# Patient Record
Sex: FEMALE | Race: WHITE | NOT HISPANIC OR LATINO | Employment: OTHER | URBAN - METROPOLITAN AREA
[De-identification: names, ages, dates, MRNs, and addresses within clinical notes are randomized per-mention and may not be internally consistent; named-entity substitution may affect disease eponyms.]

---

## 2017-08-09 ENCOUNTER — OFFICE VISIT (OUTPATIENT)
Dept: ENDOCRINOLOGY | Age: 52
End: 2017-08-09

## 2017-08-09 VITALS
BODY MASS INDEX: 19.83 KG/M2 | DIASTOLIC BLOOD PRESSURE: 62 MMHG | RESPIRATION RATE: 16 BRPM | SYSTOLIC BLOOD PRESSURE: 114 MMHG | HEIGHT: 61 IN | WEIGHT: 105 LBS

## 2017-08-09 DIAGNOSIS — E03.9 PRIMARY HYPOTHYROIDISM: ICD-10-CM

## 2017-08-09 DIAGNOSIS — E10.9 TYPE 1 DIABETES MELLITUS WITHOUT COMPLICATION (HCC): Primary | ICD-10-CM

## 2017-08-09 DIAGNOSIS — N95.1 MENOPAUSAL SYMPTOMS: ICD-10-CM

## 2017-08-09 DIAGNOSIS — E11.649 DIABETIC HYPOGLYCEMIA (HCC): ICD-10-CM

## 2017-08-09 DIAGNOSIS — D64.9 ANEMIA, UNSPECIFIED TYPE: ICD-10-CM

## 2017-08-09 PROCEDURE — 99205 OFFICE O/P NEW HI 60 MIN: CPT | Performed by: INTERNAL MEDICINE

## 2017-08-09 RX ORDER — LANCETS 28 GAUGE
EACH MISCELLANEOUS
Qty: 300 EACH | Refills: 5 | Status: SHIPPED | OUTPATIENT
Start: 2017-08-09 | End: 2017-08-09 | Stop reason: SDUPTHER

## 2017-08-09 RX ORDER — LEVOTHYROXINE SODIUM 100 UG/1
100 TABLET ORAL DAILY
Qty: 30 TABLET | Refills: 5 | Status: SHIPPED | OUTPATIENT
Start: 2017-08-09 | End: 2018-05-15 | Stop reason: SDUPTHER

## 2017-08-09 RX ORDER — SYRINGE-NEEDLE,INSULIN,0.5 ML 28GX1/2"
SYRINGE, EMPTY DISPOSABLE MISCELLANEOUS
Qty: 90 EACH | Refills: 1 | Status: SHIPPED | OUTPATIENT
Start: 2017-08-09 | End: 2017-08-09 | Stop reason: SDUPTHER

## 2017-08-09 RX ORDER — LEVOTHYROXINE SODIUM 0.1 MG/1
100 TABLET ORAL DAILY
COMMUNITY
End: 2017-08-09

## 2017-08-09 RX ORDER — LANCETS 28 GAUGE
EACH MISCELLANEOUS
Qty: 300 EACH | Refills: 5 | Status: SHIPPED | OUTPATIENT
Start: 2017-08-09

## 2017-08-09 RX ORDER — SYRINGE-NEEDLE,INSULIN,0.5 ML 28GX1/2"
SYRINGE, EMPTY DISPOSABLE MISCELLANEOUS
Qty: 90 EACH | Refills: 1 | Status: SHIPPED | OUTPATIENT
Start: 2017-08-09
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
## 2017-08-15 LAB
25(OH)D3+25(OH)D2 SERPL-MCNC: 34.4 NG/ML (ref 30–100)
ACTH PLAS-MCNC: 6.8 PG/ML (ref 7.2–63.3)
ALBUMIN SERPL-MCNC: 4.2 G/DL (ref 3.5–5.2)
ALBUMIN/GLOB SERPL: 1.3 G/DL
ALP SERPL-CCNC: 96 U/L (ref 39–117)
ALT SERPL-CCNC: 12 U/L (ref 1–33)
AST SERPL-CCNC: 21 U/L (ref 1–32)
BASOPHILS # BLD AUTO: 0.08 10*3/MM3 (ref 0–0.2)
BASOPHILS NFR BLD AUTO: 1.5 % (ref 0–1.5)
BILIRUB SERPL-MCNC: 0.7 MG/DL (ref 0.1–1.2)
BUN SERPL-MCNC: 11 MG/DL (ref 6–20)
BUN/CREAT SERPL: 15.3 (ref 7–25)
C PEPTIDE SERPL-MCNC: <0.1 NG/ML (ref 1.1–4.4)
CALCIUM SERPL-MCNC: 10.8 MG/DL (ref 8.6–10.5)
CHLORIDE SERPL-SCNC: 104 MMOL/L (ref 98–107)
CHOLEST SERPL-MCNC: 182 MG/DL (ref 0–200)
CO2 SERPL-SCNC: 23.9 MMOL/L (ref 22–29)
CORTIS SERPL-MCNC: 5 UG/DL
CREAT SERPL-MCNC: 0.72 MG/DL (ref 0.57–1)
EOSINOPHIL # BLD AUTO: 0.3 10*3/MM3 (ref 0–0.7)
EOSINOPHIL NFR BLD AUTO: 5.8 % (ref 0.3–6.2)
ERYTHROCYTE [DISTWIDTH] IN BLOOD BY AUTOMATED COUNT: 19.8 % (ref 11.7–13)
FERRITIN SERPL-MCNC: 7.22 NG/ML (ref 13–150)
FOLATE SERPL-MCNC: 11.94 NG/ML (ref 4.78–24.2)
FSH SERPL-ACNC: 90.4 MIU/ML
GAD65 AB SER IA-ACNC: 13.2 U/ML (ref 0–5)
GLOBULIN SER CALC-MCNC: 3.2 GM/DL
GLUCOSE SERPL-MCNC: 177 MG/DL (ref 65–99)
HBA1C MFR BLD: 6.4 % (ref 4.8–5.6)
HCT VFR BLD AUTO: 37.8 % (ref 35.6–45.5)
HDLC SERPL-MCNC: 73 MG/DL (ref 40–60)
HGB BLD-MCNC: 11.2 G/DL (ref 11.9–15.5)
IMM GRANULOCYTES # BLD: 0 10*3/MM3 (ref 0–0.03)
IMM GRANULOCYTES NFR BLD: 0 % (ref 0–0.5)
IRON SATN MFR SERPL: 5 % (ref 20–50)
IRON SERPL-MCNC: 26 MCG/DL (ref 37–145)
LDLC SERPL CALC-MCNC: 98 MG/DL (ref 0–100)
LH SERPL-ACNC: 49.2 MIU/ML
LYMPHOCYTES # BLD AUTO: 1.67 10*3/MM3 (ref 0.9–4.8)
LYMPHOCYTES NFR BLD AUTO: 32.1 % (ref 19.6–45.3)
MCH RBC QN AUTO: 24.8 PG (ref 26.9–32)
MCHC RBC AUTO-ENTMCNC: 29.6 G/DL (ref 32.4–36.3)
MCV RBC AUTO: 83.6 FL (ref 80.5–98.2)
MONOCYTES # BLD AUTO: 0.35 10*3/MM3 (ref 0.2–1.2)
MONOCYTES NFR BLD AUTO: 6.7 % (ref 5–12)
NEUTROPHILS # BLD AUTO: 2.8 10*3/MM3 (ref 1.9–8.1)
NEUTROPHILS NFR BLD AUTO: 53.9 % (ref 42.7–76)
NRBC BLD AUTO-RTO: 0 /100 WBC (ref 0–0)
PLATELET # BLD AUTO: 254 10*3/MM3 (ref 140–500)
POTASSIUM SERPL-SCNC: 4.6 MMOL/L (ref 3.5–5.2)
PROLACTIN SERPL-MCNC: 10.7 NG/ML (ref 4.8–23.3)
PROT SERPL-MCNC: 7.4 G/DL (ref 6–8.5)
RBC # BLD AUTO: 4.52 10*6/MM3 (ref 3.9–5.2)
RETICS/RBC NFR AUTO: 0.94 % (ref 0.5–1.5)
SODIUM SERPL-SCNC: 141 MMOL/L (ref 136–145)
T3FREE SERPL-MCNC: 2.8 PG/ML (ref 2–4.4)
T4 FREE SERPL-MCNC: 1.86 NG/DL (ref 0.93–1.7)
T4 SERPL-MCNC: 9.18 MCG/DL (ref 4.5–11.7)
THYROGLOB AB SERPL-ACNC: 503.7 IU/ML (ref 0–0.9)
THYROGLOB SERPL-MCNC: 13 NG/ML
TIBC SERPL-MCNC: 477 MCG/DL
TRIGL SERPL-MCNC: 53 MG/DL (ref 0–150)
TSH SERPL DL<=0.005 MIU/L-ACNC: 0.58 MIU/ML (ref 0.27–4.2)
UIBC SERPL-MCNC: 451 MCG/DL
URATE SERPL-MCNC: 3.5 MG/DL (ref 2.4–5.7)
VIT B12 SERPL-MCNC: 120 PG/ML (ref 211–946)
VLDLC SERPL CALC-MCNC: 10.6 MG/DL (ref 5–40)
WBC # BLD AUTO: 5.2 10*3/MM3 (ref 4.5–10.7)

## 2017-08-17 RX ORDER — METHYLDOPA 500 MG
TABLET ORAL
Qty: 30 EACH | Refills: 11 | Status: SHIPPED | OUTPATIENT
Start: 2017-08-17

## 2017-11-09 DIAGNOSIS — E03.9 PRIMARY HYPOTHYROIDISM: ICD-10-CM

## 2017-11-09 DIAGNOSIS — E78.5 HYPERLIPIDEMIA, UNSPECIFIED HYPERLIPIDEMIA TYPE: ICD-10-CM

## 2017-11-09 DIAGNOSIS — E55.9 AVITAMINOSIS D: ICD-10-CM

## 2017-11-09 DIAGNOSIS — E10.9 TYPE 1 DIABETES MELLITUS WITHOUT COMPLICATION (HCC): Primary | ICD-10-CM

## 2017-11-16 ENCOUNTER — OFFICE VISIT (OUTPATIENT)
Dept: ENDOCRINOLOGY | Age: 52
End: 2017-11-16

## 2017-11-16 VITALS
WEIGHT: 108 LBS | BODY MASS INDEX: 20.39 KG/M2 | HEIGHT: 61 IN | DIASTOLIC BLOOD PRESSURE: 68 MMHG | SYSTOLIC BLOOD PRESSURE: 120 MMHG

## 2017-11-16 DIAGNOSIS — E67.3 HYPERVITAMINOSIS D: ICD-10-CM

## 2017-11-16 DIAGNOSIS — E11.649 DIABETIC HYPOGLYCEMIA (HCC): ICD-10-CM

## 2017-11-16 DIAGNOSIS — R73.9 HYPERGLYCEMIA: ICD-10-CM

## 2017-11-16 DIAGNOSIS — E10.9 TYPE 1 DIABETES MELLITUS WITHOUT COMPLICATION (HCC): Primary | ICD-10-CM

## 2017-11-16 DIAGNOSIS — E03.9 PRIMARY HYPOTHYROIDISM: ICD-10-CM

## 2017-11-16 DIAGNOSIS — D50.8 IRON DEFICIENCY ANEMIA SECONDARY TO INADEQUATE DIETARY IRON INTAKE: ICD-10-CM

## 2017-11-16 PROCEDURE — 99214 OFFICE O/P EST MOD 30 MIN: CPT | Performed by: NURSE PRACTITIONER

## 2017-11-16 NOTE — PROGRESS NOTES
Skye Cabrera  presents to the office  for the follow-up appointment for Type 1 diabetes mellitus.     The diabete's condition location is throughout the system with the  clinical course has fluctuated, the severity is Mild, and the modifying/allievating factors are insulin pump.  Medications and  Dosages were  reviewed with Skye Cabrera and suggested that compliance most of the time.    Associated symptoms of hyperglycemia have been irritabilty.  Associated symptoms of hypoglycemia have been confusion, dizziness and sweating. Patient reports  hypoglycemia threshold for symptoms is 40 mg/dl .     The patient is currently on insulin.    Compliance with blood glucose monitoring: good.     Meal panning: The patient is using carbohydrate counting, but is not on a specified limit, being a pump user.    The patient is currently taking home blood tests - Blood glucose testin-10 times daily, that are:  fasting- 1st thing in morning before eating or drinking  before each meal  before each meal and 1 or 2 hours after meal  fasting and bedtime  bedtime  anytime you feel symptoms of hyperglycemia or hypoglycemia (high or low blood sugars)  Humalog U100  per insulin pump.    Last instructions given were:   In summary I saw and examined this 52-year-old female for above-mentioned problems.  I reviewed her office notes as well as laboratory evaluation from her former endocrinologist as well as family physician.  At this time I am going to go ahead and order an extensive laboratory evaluation and once the results come back we will go ahead and call for any possible modifications.  In the meantime I asked her to continue all her current prescriptions.  Because of multiple episodes of hypoglycemia I am relieved varying her to Ms. Monserrat Song for starting her on Medtronic 6:30 G and regulate her insulin injection and to minimize or prevent hypoglycemic episodes.    Home blood glucose testing daily: 8-10  insulin pump upload  -Yes -  "    Last reported blood glucose readings are:   She is using a Medtronic insulin pump. She is checking her BG 9 times a day. She states that she is having hypoglycemic episodes everyday.     Patterns reported per patient are none.         The following portions of the patient's history were reviewed and updated as appropriate: current medications, past family history, past medical history, past social history, past surgical history and problem list.      Current Outpatient Prescriptions:   •  amoxicillin (AMOXIL) 875 MG tablet, 1 pill q12, Disp: 20 tablet, Rfl: 0  •  JIMENEZ CONTOUR NEXT TEST test strip, Check blood glucose up to 9 times daily.  Use as instructed, Disp: 300 each, Rfl: 11  •  cyanocobalamin (VITAMIN B-12) 500 MCG tablet, Take 1 tablet by mouth Daily., Disp: 100 tablet, Rfl: 2  •  glucagon (GLUCAGEN) 1 MG injection, Inject 1 mg under the skin See Admin Instructions. Follow package directions for low blood sugar., Disp: 2 kit, Rfl: 11  •  guaiFENesin-codeine (ROMILAR-AC) 100-10 MG/5ML syrup, 5-10 cc's by mouth QID as needed for cough, Disp: 240 mL, Rfl: 0  •  insulin aspart (novoLOG) 100 UNIT/ML injection, Use of insulin pump up to 100 units daily., Disp: 30 mL, Rfl: 5  •  insulin lispro (humaLOG) 100 UNIT/ML injection, Use in insulin pump up to 100 units a day, Disp: 30 mL, Rfl: 5  •  Insulin Syringe 30G X 1/2\" 0.5 ML misc, Use for insulin injection as needed., Disp: 90 each, Rfl: 1  •  Lancets (FREESTYLE) lancets, Check blood glucose up to 9 times daily., Disp: 300 each, Rfl: 5  •  SLOW RELEASE IRON 160 (50 Fe) MG tablet controlled-release, 1 daily, Disp: 30 each, Rfl: 11  •  UNITHROID 100 MCG tablet, Take 1 tablet by mouth Daily., Disp: 30 tablet, Rfl: 5    Patient Active Problem List    Diagnosis   • Hyperglycemia [R73.9]   • Menopausal symptoms [N95.1]   • Diabetic hypoglycemia [E11.649]   • Type 1 diabetes mellitus without complication [E10.9]   • Primary hypothyroidism [E03.9]   • Anemia [D64.9] " "      Review of Systems   A comprehensive review of the 14 systems was negative except of listed below:  Endocrine: hyperglycemia     Objective:     Wt Readings from Last 3 Encounters:   11/16/17 108 lb (49 kg)   08/09/17 105 lb (47.6 kg)     Temp Readings from Last 3 Encounters:   09/19/17 99.5 °F (37.5 °C)     BP Readings from Last 3 Encounters:   11/16/17 120/68   09/19/17 126/71   08/09/17 114/62     Pulse Readings from Last 3 Encounters:   09/19/17 91        /68  Ht 61\" (154.9 cm)  Wt 108 lb (49 kg)  BMI 20.41 kg/m2    General appearance:  alert, cooperative, delirious, fatigued, nourished\" \"appears stated age and cooperative\" \"NAD   Neck: no carotid bruit, supple, symmetrical, trachea midline and thyroid not enlarged, symmetric, no tenderness/mass/nodules   Thyroid:  no palpable nodule, no enlargement, no tenderness   Lung:  \"clear to auscultation bilaterally\" \"no abnormal breath sounds  \" effort was normal, no labored breath, no use of accessory muscles.   Heart: regular rate and rhythm, S1, S2 normal, no murmur, click, rub or gallop      Abdomen:  normal bowel sounds- 4 quads, soft non-tender and contour - flat      Extremities: extremities normal, atraumatic, no cyanosis or edema extremities normal, atraumatic, no cyanosis or edema\" WNL - gait and station, strength and stability\"       Skin:   Pulses:  warm and dry, no hyperpigmentation, normal skin coloring, or suspicious lesions   2+ and symmetric   Neuro: Alert and oriented x3. Gait normal. Reflexes and motor strength normal and symmetric. Cranial nerves 2-12 and sensation grossly intact.      Psych: behavior - normal, judgement - normal, mood - normal, affect - normal                 Lab Review  Results for orders placed or performed in visit on 08/09/17   T3, Free   Result Value Ref Range    T3, Free 2.8 2.0 - 4.4 pg/mL   T4 & TSH ([facility])   Result Value Ref Range    TSH 0.583 0.270 - 4.200 mIU/mL    T4, Total 9.18 4.50 - 11.70 mcg/dL   T4, " Free   Result Value Ref Range    Free T4 1.86 (H) 0.93 - 1.70 ng/dL   Thyroglobulin With Anti-TG   Result Value Ref Range    Thyroglobulin Ab 503.7 (H) 0.0 - 0.9 IU/mL   Uric Acid   Result Value Ref Range    Uric Acid 3.5 2.4 - 5.7 mg/dL   Vitamin D 25 Hydroxy   Result Value Ref Range    25 Hydroxy, Vitamin D 34.4 30.0 - 100.0 ng/mL   Comprehensive Metabolic Panel   Result Value Ref Range    Glucose 177 (H) 65 - 99 mg/dL    BUN 11 6 - 20 mg/dL    Creatinine 0.72 0.57 - 1.00 mg/dL    eGFR Non African Am 84 >60 mL/min/1.73    eGFR African Am 102 >60 mL/min/1.73    BUN/Creatinine Ratio 15.3 7.0 - 25.0    Sodium 141 136 - 145 mmol/L    Potassium 4.6 3.5 - 5.2 mmol/L    Chloride 104 98 - 107 mmol/L    Total CO2 23.9 22.0 - 29.0 mmol/L    Calcium 10.8 (H) 8.6 - 10.5 mg/dL    Total Protein 7.4 6.0 - 8.5 g/dL    Albumin 4.20 3.50 - 5.20 g/dL    Globulin 3.2 gm/dL    A/G Ratio 1.3 g/dL    Total Bilirubin 0.7 0.1 - 1.2 mg/dL    Alkaline Phosphatase 96 39 - 117 U/L    AST (SGOT) 21 1 - 32 U/L    ALT (SGPT) 12 1 - 33 U/L   C-Peptide   Result Value Ref Range    C-Peptide <0.1 (L) 1.1 - 4.4 ng/mL   Follicle Stimulating Hormone   Result Value Ref Range    FSH 90.4 mIU/mL   Hemoglobin A1c   Result Value Ref Range    Hemoglobin A1C 6.40 (H) 4.80 - 5.60 %   Lipid Panel   Result Value Ref Range    Total Cholesterol 182 0 - 200 mg/dL    Triglycerides 53 0 - 150 mg/dL    HDL Cholesterol 73 (H) 40 - 60 mg/dL    VLDL Cholesterol 10.6 5 - 40 mg/dL    LDL Cholesterol  98 0 - 100 mg/dL   Luteinizing Hormone   Result Value Ref Range    LH 49.2 mIU/mL   Prolactin   Result Value Ref Range    Prolactin 10.7 4.8 - 23.3 ng/mL   ACTH   Result Value Ref Range    ACTH 6.8 (L) 7.2 - 63.3 pg/mL   Cortisol   Result Value Ref Range    Cortisol 5.0 ug/dL   Glutamic Acid Decarboxylase   Result Value Ref Range    MAXIMUS-65 13.2 (H) 0.0 - 5.0 U/mL   Iron Profile   Result Value Ref Range    TIBC 477 mcg/dL    UIBC 451 mcg/dL    Iron 26 (L) 37 - 145 mcg/dL     Iron Saturation 5 (L) 20 - 50 %   Vitamin B12 & Folate   Result Value Ref Range    Vitamin B-12 120 (L) 211 - 946 pg/mL    Folate 11.94 4.78 - 24.20 ng/mL   Thyroglobulin By IVY   Result Value Ref Range    Thyroglobulin (TG-IVY) 13 ng/mL   Ferritin   Result Value Ref Range    Ferritin 7.22 (L) 13.00 - 150.00 ng/mL   Reticulocytes   Result Value Ref Range    Reticulocyte Absolute 0.94 0.50 - 1.50 %   CBC & Differential   Result Value Ref Range    WBC 5.20 4.50 - 10.70 10*3/mm3    RBC 4.52 3.90 - 5.20 10*6/mm3    Hemoglobin 11.2 (L) 11.9 - 15.5 g/dL    Hematocrit 37.8 35.6 - 45.5 %    MCV 83.6 80.5 - 98.2 fL    MCH 24.8 (L) 26.9 - 32.0 pg    MCHC 29.6 (L) 32.4 - 36.3 g/dL    RDW 19.8 (H) 11.7 - 13.0 %    Platelets 254 140 - 500 10*3/mm3    Neutrophil Rel % 53.9 42.7 - 76.0 %    Lymphocyte Rel % 32.1 19.6 - 45.3 %    Monocyte Rel % 6.7 5.0 - 12.0 %    Eosinophil Rel % 5.8 0.3 - 6.2 %    Basophil Rel % 1.5 0.0 - 1.5 %    Neutrophils Absolute 2.80 1.90 - 8.10 10*3/mm3    Lymphocytes Absolute 1.67 0.90 - 4.80 10*3/mm3    Monocytes Absolute 0.35 0.20 - 1.20 10*3/mm3    Eosinophils Absolute 0.30 0.00 - 0.70 10*3/mm3    Basophils Absolute 0.08 0.00 - 0.20 10*3/mm3    Immature Granulocyte Rel % 0.0 0.0 - 0.5 %    Immature Grans Absolute 0.00 0.00 - 0.03 10*3/mm3    nRBC 0.0 0.0 - 0.0 /100 WBC           Assessment:   Skye was seen today for follow-up.    Diagnoses and all orders for this visit:    Type 1 diabetes mellitus without complication  -     Comprehensive Metabolic Panel  -     Hemoglobin A1c  -     Insulin, Total  -     Lipid Panel  -     Microalbumin / Creatinine Urine Ratio - Urine, Clean Catch  -     Uric Acid  -     Vitamin D 25 Hydroxy  -     TSH  -     T4, Free  -     T3, Free  -     Anemia Profile B ([facility])  -     Anemia Profile A ([facility])    Primary hypothyroidism  -     Comprehensive Metabolic Panel  -     Hemoglobin A1c  -     Insulin, Total  -     Lipid Panel  -     Microalbumin / Creatinine Urine Ratio  - Urine, Clean Catch  -     Uric Acid  -     Vitamin D 25 Hydroxy  -     TSH  -     T4, Free  -     T3, Free  -     Anemia Profile B ([facility])  -     Anemia Profile A ([facility])    Diabetic hypoglycemia  -     Comprehensive Metabolic Panel  -     Hemoglobin A1c  -     Insulin, Total  -     Lipid Panel  -     Microalbumin / Creatinine Urine Ratio - Urine, Clean Catch  -     Uric Acid  -     Vitamin D 25 Hydroxy  -     TSH  -     T4, Free  -     T3, Free  -     Anemia Profile B ([facility])  -     Anemia Profile A ([facility])    Hyperglycemia  -     Comprehensive Metabolic Panel  -     Hemoglobin A1c  -     Insulin, Total  -     Lipid Panel  -     Microalbumin / Creatinine Urine Ratio - Urine, Clean Catch  -     Uric Acid  -     Vitamin D 25 Hydroxy  -     TSH  -     T4, Free  -     T3, Free  -     Anemia Profile B ([facility])  -     Anemia Profile A ([facility])    Iron deficiency anemia secondary to inadequate dietary iron intake  -     Comprehensive Metabolic Panel  -     Hemoglobin A1c  -     Insulin, Total  -     Lipid Panel  -     Microalbumin / Creatinine Urine Ratio - Urine, Clean Catch  -     Uric Acid  -     Vitamin D 25 Hydroxy  -     TSH  -     T4, Free  -     T3, Free  -     Anemia Profile B ([facility])  -     Anemia Profile A ([facility])    Hypervitaminosis D   -     Comprehensive Metabolic Panel  -     Hemoglobin A1c  -     Insulin, Total  -     Lipid Panel  -     Microalbumin / Creatinine Urine Ratio - Urine, Clean Catch  -     Uric Acid  -     Vitamin D 25 Hydroxy  -     TSH  -     T4, Free  -     T3, Free  -     Anemia Profile B ([facility])  -     Anemia Profile A ([facility])        Plan:    In summary: Met with patient today who is metabolically stable and doing well.  Patient states that she has been a diabetic his teenage years.  And has more pump for 12 years.  With assessment she has not had advanced features on her pump.  With the 630G there was no teaching done.  They transferred settings to the pump.  She does  not know dual wave combo bolus seen square bolus seen any of the advanced features on the pump.  As of right now she uses it as an insulin delivery system but with her logbook shows several hypoglycemia episodes  At this time no insulin pump changes will be done.  She is presently at  Basal 12 AM 0. 75 units per hour  7 AM 0.6 units per hour  230.65 units per hour  730.65 units per hour    Carb ratio one unit for every 12 g  Insulin sensitivity 1 unit for 50  Blood glucose 120 is a target maximum bolus 10 units active insulin 4 hours easy bolus is off bolus rachel is on  When questioning her she states that when her blood glucose is over 200 mg/Torsten she takes more on an average of 1 unit for 44 his sensitivity and then when she is about 180 she takes an average of 45.  New settings will be 1 unit for every 46 for blood glucose 110 through 120.    Discussed I pro message put in for this be ordered    Paperwork filled out for decks calm secondary to numerous hypoglycemic episodes    Referral put in for Medtronic certified nurse specialist advanced features with Monserrat    Education:  interpretation of lab results, blood sugar goals, complications of diabetes mellitus, hypoglycemia prevention and treatment, exercise, illness management, self-monitoring of blood glucose skills, nutrition, carbohydrate counting and site rotation    home blood tests -  Blood glucose testin times daily, that are:  fasting- 1st thing in morning before eating or drinking  before each meal and 1 or 2 hours after meal  bedtime  anytime you feel symptoms of hyperglycemia or hypoglycemia (high or low blood sugars)    Office visit 45 minutes with additional education given 20 minutes - SMBG with goals, medication changes with purposes and s/e profiles.  Continuous glucose close monitoring, counting carbs, use in pump correctly, ordering I pro      Return in about 3 months (around 2018), or if symptoms worsen or fail to improve, for Recheck.  3 months with Haritha-1 week prior for labs 6 months with Dr. Morris-one week prior for labs        Dragon transcription disclaimer     Much of this encounter note is an electronic transcription/translation of spoken language to printed text. The electronic translation of spoken language may permit erroneous, or at times, nonsensical words or phrases to be inadvertently transcribed. Although I have reviewed the note for such errors, some may still exist.

## 2017-11-17 LAB
25(OH)D3+25(OH)D2 SERPL-MCNC: 31.7 NG/ML (ref 30–100)
ALBUMIN SERPL-MCNC: 4.3 G/DL (ref 3.5–5.2)
ALBUMIN/GLOB SERPL: 1.7 G/DL
ALP SERPL-CCNC: 121 U/L (ref 39–117)
ALT SERPL-CCNC: 20 U/L (ref 1–33)
AST SERPL-CCNC: 29 U/L (ref 1–32)
BASOPHILS # BLD AUTO: 0.07 10*3/MM3 (ref 0–0.2)
BASOPHILS NFR BLD AUTO: 1 % (ref 0–1.5)
BILIRUB SERPL-MCNC: 0.5 MG/DL (ref 0.1–1.2)
BUN SERPL-MCNC: 7 MG/DL (ref 6–20)
BUN/CREAT SERPL: 9.9 (ref 7–25)
CALCIUM SERPL-MCNC: 10.5 MG/DL (ref 8.6–10.5)
CHLORIDE SERPL-SCNC: 107 MMOL/L (ref 98–107)
CHOLEST SERPL-MCNC: 189 MG/DL (ref 0–200)
CO2 SERPL-SCNC: 26 MMOL/L (ref 22–29)
CREAT SERPL-MCNC: 0.71 MG/DL (ref 0.57–1)
EOSINOPHIL # BLD AUTO: 0.48 10*3/MM3 (ref 0–0.7)
EOSINOPHIL NFR BLD AUTO: 7.1 % (ref 0.3–6.2)
ERYTHROCYTE [DISTWIDTH] IN BLOOD BY AUTOMATED COUNT: 17 % (ref 11.7–13)
FERRITIN SERPL-MCNC: 31.86 NG/ML (ref 13–150)
FOLATE SERPL-MCNC: 6.72 NG/ML (ref 4.78–24.2)
GFR SERPLBLD CREATININE-BSD FMLA CKD-EPI: 104 ML/MIN/1.73
GFR SERPLBLD CREATININE-BSD FMLA CKD-EPI: 86 ML/MIN/1.73
GLOBULIN SER CALC-MCNC: 2.6 GM/DL
GLUCOSE SERPL-MCNC: 107 MG/DL (ref 65–99)
HBA1C MFR BLD: 5.57 % (ref 4.8–5.6)
HCT VFR BLD AUTO: 45.9 % (ref 35.6–45.5)
HDLC SERPL-MCNC: 69 MG/DL (ref 40–60)
HGB BLD-MCNC: 14.9 G/DL (ref 11.9–15.5)
IMM GRANULOCYTES # BLD: 0 10*3/MM3 (ref 0–0.03)
IMM GRANULOCYTES NFR BLD: 0 % (ref 0–0.5)
INSULIN SERPL-ACNC: <0.2 UIU/ML (ref 2.6–24.9)
IRON SATN MFR SERPL: 20 % (ref 20–50)
IRON SERPL-MCNC: 69 MCG/DL (ref 37–145)
LDLC SERPL CALC-MCNC: 93 MG/DL (ref 0–100)
LYMPHOCYTES # BLD AUTO: 2 10*3/MM3 (ref 0.9–4.8)
LYMPHOCYTES NFR BLD AUTO: 29.7 % (ref 19.6–45.3)
MCH RBC QN AUTO: 31.5 PG (ref 26.9–32)
MCHC RBC AUTO-ENTMCNC: 32.5 G/DL (ref 32.4–36.3)
MCV RBC AUTO: 97 FL (ref 80.5–98.2)
MONOCYTES # BLD AUTO: 0.56 10*3/MM3 (ref 0.2–1.2)
MONOCYTES NFR BLD AUTO: 8.3 % (ref 5–12)
NEUTROPHILS # BLD AUTO: 3.62 10*3/MM3 (ref 1.9–8.1)
NEUTROPHILS NFR BLD AUTO: 53.9 % (ref 42.7–76)
PLATELET # BLD AUTO: 179 10*3/MM3 (ref 140–500)
POTASSIUM SERPL-SCNC: 4.7 MMOL/L (ref 3.5–5.2)
PROT SERPL-MCNC: 6.9 G/DL (ref 6–8.5)
RBC # BLD AUTO: 4.73 10*6/MM3 (ref 3.9–5.2)
RETICS/RBC NFR AUTO: 0.88 % (ref 0.5–1.5)
SODIUM SERPL-SCNC: 144 MMOL/L (ref 136–145)
T3FREE SERPL-MCNC: 2.4 PG/ML (ref 2–4.4)
T4 FREE SERPL-MCNC: 1.25 NG/DL (ref 0.93–1.7)
TIBC SERPL-MCNC: 340 MCG/DL (ref 298–536)
TRIGL SERPL-MCNC: 136 MG/DL (ref 0–150)
TSH SERPL DL<=0.005 MIU/L-ACNC: 1.71 MIU/ML (ref 0.27–4.2)
UIBC SERPL-MCNC: 271 MCG/DL
URATE SERPL-MCNC: 3.8 MG/DL (ref 2.4–5.7)
VIT B12 SERPL-MCNC: 476 PG/ML (ref 211–946)
VLDLC SERPL CALC-MCNC: 27.2 MG/DL (ref 5–40)
WBC # BLD AUTO: 6.73 10*3/MM3 (ref 4.5–10.7)

## 2017-12-09 ENCOUNTER — RESULTS ENCOUNTER (OUTPATIENT)
Dept: ENDOCRINOLOGY | Age: 52
End: 2017-12-09

## 2017-12-09 DIAGNOSIS — E78.5 HYPERLIPIDEMIA, UNSPECIFIED HYPERLIPIDEMIA TYPE: ICD-10-CM

## 2017-12-09 DIAGNOSIS — E55.9 AVITAMINOSIS D: ICD-10-CM

## 2017-12-09 DIAGNOSIS — E03.9 PRIMARY HYPOTHYROIDISM: ICD-10-CM

## 2017-12-09 DIAGNOSIS — E10.9 TYPE 1 DIABETES MELLITUS WITHOUT COMPLICATION (HCC): ICD-10-CM

## 2018-01-08 ENCOUNTER — TREATMENT (OUTPATIENT)
Dept: ENDOCRINOLOGY | Age: 53
End: 2018-01-08

## 2018-01-08 DIAGNOSIS — Z79.4 LONG-TERM INSULIN USE (HCC): ICD-10-CM

## 2018-01-08 DIAGNOSIS — E10.9 TYPE 1 DIABETES MELLITUS WITHOUT COMPLICATION (HCC): Primary | ICD-10-CM

## 2018-01-08 PROCEDURE — 95250 CONT GLUC MNTR PHYS/QHP EQP: CPT | Performed by: NURSE PRACTITIONER

## 2018-01-19 ENCOUNTER — TELEPHONE (OUTPATIENT)
Dept: ENDOCRINOLOGY | Age: 53
End: 2018-01-19

## 2018-01-19 ENCOUNTER — TREATMENT (OUTPATIENT)
Dept: ENDOCRINOLOGY | Age: 53
End: 2018-01-19

## 2018-01-19 DIAGNOSIS — E10.9 TYPE 1 DIABETES MELLITUS WITHOUT COMPLICATION (HCC): Primary | ICD-10-CM

## 2018-01-19 DIAGNOSIS — Z79.4 LONG-TERM INSULIN USE (HCC): ICD-10-CM

## 2018-01-19 PROCEDURE — 95251 CONT GLUC MNTR ANALYSIS I&R: CPT | Performed by: NURSE PRACTITIONER

## 2018-01-19 NOTE — PROGRESS NOTES
Pro report from January 8 through January 12, 2019    Time in range 19% the time above 150 mg/Torsten, 40% of the time in range and 33% of the time below 70 mg/Torsten.    Average glucose 104 mg/Torsten    Estimated A1c 5.2%    Observe patterns 1.  Variable glucose with hypoglycemia and lunchtime- 11 AM through 3 PM.  3 days be in the last and 50 mg/Torsten in 3 days be in over 150 mg/Torsten.    #2.  Hypoglycemic overnight 11 PM through 6 AM.  3 out of 5 days excursions observed 2 days be in 50 through 80 mg/Torsten and one day being less than 50 mg/Torsten    3.  Hyperglycemic post dinner 5 PM through 8 PM.  3 out of 5 days excursions observed.  One day being 50 through 70 mg/Torsten and 2 days being less than 50 mg/Torsten    Insert information  There was 1400 readings in the study.  Highest reading 276 mg/Torsten  Lowest reading 40 mg/Torsten that appears to be at 2 AM extending through 4 AM again at 12 AM  Average 104 mg/Torsten  Standard deviation 54 mg/Torsten  MAD percentage 12.1%    Excursions 20 excursions split 50-50 with TN B and high per and 10 being hypo-    Daily average with meals  There was no information with breakfast  Before lunch 12 readings with an average of 243 mg/Torsten  After lunch 24 readings with an average of 54 mg/Torsten  Before dinner dinner 38 readings with an average of 172 mg/Torsten  96 readings with an average of 81 mg/Torsten        Present therapy:  Basal 12 AM 0. 75 units per hour  7 AM 0.6 units per hour  230.65 units per hour  730.65 units per hour     Carb ratio one unit for every 12 g  Insulin sensitivity 1 unit for 50  Blood glucose 120 is a target maximum bolus 10 units active insulin 4 hours easy bolus is off bolus rachel is on  When questioning her she states that when her blood glucose is over 200 mg/Torsten she takes more on an average of 1 unit for 44 his sensitivity and then when she is about 180 she takes an average of 45.  New settings will be 1 unit for every 46 for blood glucose 110 through 120.        Calculation if her total  daily dose is 32 units she would be on a total daily dose of insulin per pump will be 24 her basal bolus would be 12 with a 0.5 for pump with a carb ratio one unit for every 21 g with the sensitivity 1 unit for every 75      Future therapy.  Check on continuous glucose monitoring-either  Dexcom or see if she has secondary coverage    12 AM-0.475 units per hour  6 AM 0.5 units per hour  7 PM 0.75 units per hour    Carb ratio change 1 unit for every 21 g    Sensitivity change 1 unit for every 75 mg/Torsten    Targets 110 through 120 mg/Torsten.

## 2018-01-19 NOTE — PROGRESS NOTES
The iPro Continuous Glucose Monitor is not implanted! A tiny glucose sensor at the end of the recorder will be inserted under the skin in a virtually painless process. Once it is inserted you will continue your daily routine as usual. Adverse reactions associated with glucose sensor insertion are highly uncommon and are limited to bleeding, irritation, pain, rash, infection, raised bump, and irritation at the site from the tape or bandage to secure the iPro CGM to the skin.    Glucose measurements are automatically collected and stored in the recorder. A total of 288 glucose readings- every 5 minutes throughout the day are recorded. Once your testing is complete, personalized reports will be generated allowing you and your doctor to fine tune your diabetes therapy. Remember, it does not display glucose values and is not intended to replace home glucose monitoring.    • Take blood glucose readings 4 times a day for the next 3 days  • Record these and daily events such as meals, insulin (if you take) and exercise in the Patient Logbook you are given.    o Test with your BG meter 1 hour after the start of your iPro study (and not before 1 hour) and record the exact time in your logbook.   o Test 4 times a day using your BG meter before breakfast, lunch, dinner and before bed and record the exact time in your logbook.     • Do not change any settings on your BG meter during the study and do not let anyone else use your meter during the study.    • Protect the iPro Recorder and glucose sensor site from accidental removal and refrain from contact sports or activities which may damage the monitor.     • If you are to have an x-ray, CT or MRI, the iPro needs to be removed prior to doing so.     • The iPro is waterproof, but should not be worn swimming for longer than 30 minutes at a depth of 8 feet. Hot baths should be avoided for longer than 10 minutes.     • Avoid wearing or being around magnets while wearing the iPro  (examples: cell phone de dios, bracelet, belt, magnetic mattress pad).     • Do not administer insulin within 3 inches of the glucose sensor site.     • If tape peels back/becomes loose-do not remove or you could pull the sensor out! Place another piece of tape or band aid on top to secure.     • If the iPro accidentally falls off do not take apart any of the pieces…..keep the tape, sensor and recorder attached-in one piece-as it came off your body. Do not try to clean the iPro. Place this in a ziplock bag and bring back to the clinic with your logbook and glucose meter if one was provided to you  Please come back to your doctor’s office and have the iPro,  removed by the office staff.  Do not remove yourself.           Test your blood sugar levels 4 times per day:    • Before Breakfast  • Before Lunch  • Before Dinner  • Before Bed      Be sure to fill out your log sheets.     Test your blood sugar 15 minutes before your iPro being removed.     If you have any questions or concerns while wearing the device please call the office. [phone]

## 2018-01-19 NOTE — TELEPHONE ENCOUNTER
----- Message from RAMANA Hernandez sent at 1/19/2018  2:54 PM EST -----  Hypoglycemia with nocturnal hypoglycemia with a lot of labile blood glucose throughout the day.  These changes need to be done.      12 AM-0.475 units per hour  6 AM 0.5 units per hour  7 PM 0.75 units per hour    Carb ratio change 1 unit for every 21 g    Sensitivity change 1 unit for every 75 mg/Torsten    1/19/18  Spoke with patient, she stated that she was going to try these settings but that she knows they aren't going to work so she is not going to make any adjustments until Sunday afternoon.

## 2018-02-19 LAB
25(OH)D3+25(OH)D2 SERPL-MCNC: 32.7 NG/ML (ref 30–100)
ALBUMIN SERPL-MCNC: 4.3 G/DL (ref 3.5–5.2)
ALBUMIN/GLOB SERPL: 1.5 G/DL
ALP SERPL-CCNC: 122 U/L (ref 39–117)
ALT SERPL-CCNC: 18 U/L (ref 1–33)
AST SERPL-CCNC: 25 U/L (ref 1–32)
BILIRUB SERPL-MCNC: 0.9 MG/DL (ref 0.1–1.2)
BUN SERPL-MCNC: 8 MG/DL (ref 6–20)
BUN/CREAT SERPL: 11.4 (ref 7–25)
C PEPTIDE SERPL-MCNC: <0.1 NG/ML (ref 1.1–4.4)
CALCIUM SERPL-MCNC: 10.5 MG/DL (ref 8.6–10.5)
CHLORIDE SERPL-SCNC: 100 MMOL/L (ref 98–107)
CHOLEST SERPL-MCNC: 180 MG/DL (ref 0–200)
CO2 SERPL-SCNC: 26 MMOL/L (ref 22–29)
CREAT SERPL-MCNC: 0.7 MG/DL (ref 0.57–1)
FT4I SERPL CALC-MCNC: 4 (ref 1.2–4.9)
GFR SERPLBLD CREATININE-BSD FMLA CKD-EPI: 105 ML/MIN/1.73
GFR SERPLBLD CREATININE-BSD FMLA CKD-EPI: 87 ML/MIN/1.73
GLOBULIN SER CALC-MCNC: 2.9 GM/DL
GLUCOSE SERPL-MCNC: 281 MG/DL (ref 65–99)
HBA1C MFR BLD: 6.01 % (ref 4.8–5.6)
HDLC SERPL-MCNC: 68 MG/DL (ref 40–60)
INTERPRETATION: NORMAL
LDLC SERPL CALC-MCNC: 91 MG/DL (ref 0–100)
POTASSIUM SERPL-SCNC: 4.4 MMOL/L (ref 3.5–5.2)
PROT SERPL-MCNC: 7.2 G/DL (ref 6–8.5)
SODIUM SERPL-SCNC: 138 MMOL/L (ref 136–145)
T3FREE SERPL-MCNC: 2.8 PG/ML (ref 2–4.4)
T3RU NFR SERPL: 35 % (ref 24–39)
T4 FREE SERPL-MCNC: 1.89 NG/DL (ref 0.93–1.7)
T4 SERPL-MCNC: 11.4 UG/DL (ref 4.5–12)
THYROGLOB AB SERPL-ACNC: 572.6 IU/ML (ref 0–0.9)
THYROGLOB SERPL-MCNC: 13 NG/ML
TRIGL SERPL-MCNC: 103 MG/DL (ref 0–150)
TSH SERPL DL<=0.005 MIU/L-ACNC: 0.82 UIU/ML (ref 0.45–4.5)
VLDLC SERPL CALC-MCNC: 20.6 MG/DL (ref 5–40)

## 2018-02-20 ENCOUNTER — OFFICE VISIT (OUTPATIENT)
Dept: ENDOCRINOLOGY | Age: 53
End: 2018-02-20

## 2018-02-20 VITALS
SYSTOLIC BLOOD PRESSURE: 118 MMHG | DIASTOLIC BLOOD PRESSURE: 72 MMHG | WEIGHT: 105 LBS | HEIGHT: 61 IN | BODY MASS INDEX: 19.83 KG/M2

## 2018-02-20 DIAGNOSIS — E11.649 DIABETIC HYPOGLYCEMIA (HCC): ICD-10-CM

## 2018-02-20 DIAGNOSIS — E10.9 TYPE 1 DIABETES MELLITUS WITHOUT COMPLICATION (HCC): Primary | ICD-10-CM

## 2018-02-20 DIAGNOSIS — D51.9 ANEMIA DUE TO VITAMIN B12 DEFICIENCY, UNSPECIFIED B12 DEFICIENCY TYPE: ICD-10-CM

## 2018-02-20 DIAGNOSIS — E03.9 PRIMARY HYPOTHYROIDISM: ICD-10-CM

## 2018-02-20 DIAGNOSIS — E61.1 IRON DEFICIENCY: ICD-10-CM

## 2018-02-20 PROCEDURE — 99215 OFFICE O/P EST HI 40 MIN: CPT | Performed by: NURSE PRACTITIONER

## 2018-02-20 NOTE — PROGRESS NOTES
Skye Cabrera  presents to the office  for the follow-up appointment for Type 1 diabetes mellitus.     The diabete's condition location is throughout the system with the  clinical course has fluctuated, the severity is Mild, and the modifying/allievating factors are insulin pump.  Medications and  Dosages were  reviewed with Skye Cabrera and suggested that compliance most of the time.    Associated symptoms of hyperglycemia have been irritability.  Associated symptoms of hypoglycemia have been confusion, dizziness and sweating. Patient reports  hypoglycemia threshold for symptoms is 40 mg/dl .     The patient is currently on insulin.    Compliance with blood glucose monitoring: good.     Meal panning: The patient is using carbohydrate counting, but is not on a specified limit, being a pump user.    The patient is currently taking home blood tests - Blood glucose testin-8 times daily, that are:  fasting- 1st thing in morning before eating or drinking  before each meal and 1 or 2 hours after meal  bedtime  anytime you feel symptoms of hyperglycemia or hypoglycemia (high or low blood sugars)  Humalog U100 per insulin pump    Last instructions given were:  At this time no insulin pump changes will be done.  She is presently at  Basal 12 AM 0. 75 units per hour  7 AM 0.6 units per hour  230.65 units per hour  730.65 units per hour     Carb ratio one unit for every 12 g  Insulin sensitivity 1 unit for 50  Blood glucose 120 is a target maximum bolus 10 units active insulin 4 hours easy bolus is off bolus rachel is on  When questioning her she states that when her blood glucose is over 200 mg/Torsten she takes more on an average of 1 unit for 44 his sensitivity and then when she is about 180 she takes an average of 45.  New settings will be 1 unit for every 46 for blood glucose 110 through 120.    Home blood glucose testing daily: 4-8  insulin pump upload  -Yes- see pump upload dated  through 2018  "average blood glucose is 129+ or negative's 61 checking 6.2 times a day 23% above target 18% below target with a 31.3% total daily dose with 44% BM basal and 56% BM bolusing.  Labile blood glucose showing hypoglycemic throughout the day appears to be secondary to fall from correction.    Last reported blood glucose readings are:  Home blood glucose testing daily: 8-10  insulin pump upload  -Yes -     Patterns reported per patient are none.         The following portions of the patient's history were reviewed and updated as appropriate: current medications, past family history, past medical history, past social history, past surgical history and problem list.      Current Outpatient Prescriptions:   •  amoxicillin (AMOXIL) 875 MG tablet, 1 pill q12, Disp: 20 tablet, Rfl: 0  •  JIMENEZ CONTOUR NEXT TEST test strip, Check blood glucose up to 9 times daily.  Use as instructed, Disp: 300 each, Rfl: 11  •  cyanocobalamin (VITAMIN B-12) 500 MCG tablet, Take 1 tablet by mouth Daily., Disp: 100 tablet, Rfl: 2  •  glucagon (GLUCAGEN) 1 MG injection, Inject 1 mg under the skin See Admin Instructions. Follow package directions for low blood sugar., Disp: 2 kit, Rfl: 11  •  guaiFENesin-codeine (ROMILAR-AC) 100-10 MG/5ML syrup, 5-10 cc's by mouth QID as needed for cough, Disp: 240 mL, Rfl: 0  •  insulin aspart (novoLOG) 100 UNIT/ML injection, Use of insulin pump up to 100 units daily., Disp: 30 mL, Rfl: 5  •  insulin lispro (humaLOG) 100 UNIT/ML injection, Use in insulin pump up to 100 units a day, Disp: 30 mL, Rfl: 5  •  Insulin Syringe 30G X 1/2\" 0.5 ML misc, Use for insulin injection as needed., Disp: 90 each, Rfl: 1  •  Lancets (FREESTYLE) lancets, Check blood glucose up to 9 times daily., Disp: 300 each, Rfl: 5  •  SLOW RELEASE IRON 160 (50 Fe) MG tablet controlled-release, 1 daily, Disp: 30 each, Rfl: 11  •  UNITHROID 100 MCG tablet, Take 1 tablet by mouth Daily., Disp: 30 tablet, Rfl: 5    Patient Active Problem List    " "Diagnosis   • Iron deficiency [E61.1]   • Long-term insulin use [Z79.4]   • Hyperglycemia [R73.9]   • Menopausal symptoms [N95.1]   • Diabetic hypoglycemia [E11.649]   • Type 1 diabetes mellitus without complication [E10.9]   • Primary hypothyroidism [E03.9]   • Anemia [D64.9]       Review of Systems   A comprehensive review of the 14 systems was negative except of listed below:  Endocrine: hyperglycemia     Objective:     Wt Readings from Last 3 Encounters:   02/20/18 47.6 kg (105 lb)   11/16/17 49 kg (108 lb)   08/09/17 47.6 kg (105 lb)     Temp Readings from Last 3 Encounters:   09/19/17 99.5 °F (37.5 °C)     BP Readings from Last 3 Encounters:   02/20/18 118/72   11/16/17 120/68   09/19/17 126/71     Pulse Readings from Last 3 Encounters:   09/19/17 91        /72  Ht 154.9 cm (60.98\")  Wt 47.6 kg (105 lb)  BMI 19.85 kg/m2    General appearance:  alert, cooperative, delirious, fatigued, nourished\" \"appears stated age and cooperative\" \"NAD   Neck: no carotid bruit, supple, symmetrical, trachea midline and thyroid not enlarged, symmetric, no tenderness/mass/nodules   Thyroid:  no palpable nodule, no enlargement, no tenderness   Lung:  \"clear to auscultation bilaterally\" \"no abnormal breath sounds  \" effort was normal, no labored breath, no use of accessory muscles.   Heart: regular rate and rhythm, S1, S2 normal, no murmur, click, rub or gallop      Abdomen:  normal bowel sounds- 4 quads, soft non-tender and contour - slt rounded      Extremities: extremities normal, atraumatic, no cyanosis or edema extremities normal, atraumatic, no cyanosis or edema\" WNL - gait and station, strength and stability\"       Skin:   Pulses:  warm and dry, no hyperpigmentation, normal skin coloring, or suspicious lesions   2+ and symmetric   Neuro: Alert and oriented x3. Gait normal. Reflexes and motor strength normal and symmetric. Cranial nerves 2-12 and sensation grossly intact.      Psych: behavior - normal, judgement - " normal, mood - normal, affect - normal                 Lab Review  Results for orders placed or performed in visit on 02/20/18   Anemia Profile B ([facility])   Result Value Ref Range    TIBC 301 298 - 536 mcg/dL    UIBC 224 mcg/dL    Iron 77 37 - 145 mcg/dL    Iron Saturation 26 20 - 50 %    Ferritin 61.15 13.00 - 150.00 ng/mL    Vitamin B-12 450 211 - 946 pg/mL    Folate 7.44 4.78 - 24.20 ng/mL    WBC 5.97 4.50 - 10.70 10*3/mm3    RBC 4.33 3.90 - 5.20 10*6/mm3    Hemoglobin 14.1 11.9 - 15.5 g/dL    Hematocrit 44.3 35.6 - 45.5 %    .3 (H) 80.5 - 98.2 fL    MCH 32.6 (H) 26.9 - 32.0 pg    MCHC 31.8 (L) 32.4 - 36.3 g/dL    RDW 14.0 (H) 11.7 - 13.0 %    Platelets 226 140 - 500 10*3/mm3    Neutrophil Rel % 52.9 42.7 - 76.0 %    Lymphocyte Rel % 34.8 19.6 - 45.3 %    Monocyte Rel % 6.7 5.0 - 12.0 %    Eosinophil Rel % 4.9 0.3 - 6.2 %    Basophil Rel % 0.7 0.0 - 1.5 %    Neutrophils Absolute 3.16 1.90 - 8.10 10*3/mm3    Lymphocytes Absolute 2.08 0.90 - 4.80 10*3/mm3    Monocytes Absolute 0.40 0.20 - 1.20 10*3/mm3    Eosinophils Absolute 0.29 0.00 - 0.70 10*3/mm3    Basophils Absolute 0.04 0.00 - 0.20 10*3/mm3    Immature Granulocyte Rel % 0.0 0.0 - 0.5 %    Immature Grans Absolute 0.00 0.00 - 0.03 10*3/mm3    Reticulocyte Absolute 0.94 0.50 - 1.50 %           Assessment:   Skye was seen today for follow-up.    Diagnoses and all orders for this visit:    Type 1 diabetes mellitus without complication    Primary hypothyroidism    Diabetic hypoglycemia    Anemia due to vitamin B12 deficiency, unspecified B12 deficiency type  -     Anemia Profile A ([facility])  -     Anemia Profile B ([facility])  -     CBC & Differential    Iron deficiency  -     Anemia Profile A ([facility])  -     Anemia Profile B ([facility])  -     CBC & Differential          Plan:   In Summary:   I met with patient today who is metabolically stable.  Patient is reporting chronic fatigue, has a history of anemia-her anemia profile was not obtained with present  lab work we will obtain this today.  She also reports continuing labile blood glucose with no patterns.  With the insulin pump upload shows several manual bolusing on top of bolus wizard.  When this provider questioned her she cannot remember why she is manual bolusing and also does not show the numerous bolusing on top of the bolus wizard with the log book and shows drastic drops after a correction.  With this the active insulin time will be slowed in addition I will also change the blood glucose target that this patient has changed in the past herself.    Insulin pump settings are as follows:  Basals:  12 AM 0.525 units per hour  6 AM 0.525 units per hour  7 PM 0.75 units per hour    Carb ratio one unit for every 10 g    Insulin sensitivity 1 unit for every 50    Blood glucose targets 120 through 130 mg/Torsten  Active insulin time 4 hours  Dual wave is on bolus speed standard  Encourage patient to use bolus wizard.      Education:  interpretation of lab results, blood sugar goals, complications of diabetes mellitus, hypoglycemia prevention and treatment, exercise, illness management, self-monitoring of blood glucose skills, nutrition, carbohydrate counting and site rotation    home blood tests -  Blood glucose testin times daily, that are:  fasting- 1st thing in morning before eating or drinking  before each meal and 1 or 2 hours after meal  bedtime  anytime you feel symptoms of hyperglycemia or hypoglycemia (high or low blood sugars)    Place to call again to [facility] for the sensor for this patient- will followup on this matter.  Upon discharge patient was tearful said that she was tearful and tired of the disease state itself this provider that discussed the disease state offered counseling with [facility] that specializes with chronic disease states she says that she did not want to talk about it that she was just tired of it and felt bad that her family had to live with it.  This provider  discussed in detail the importance sometimes of just discussing her fears and feelings with someone other than family and her provider.  She said that she would think about it and let this provider know.    Office visit 45 minutes with additional education given 25 minutes - SMBG with goals, medication changes with purposes and s/e profiles.       Return in about 3 months (around 5/20/2018), or if symptoms worsen or fail to improve, for Recheck.        Dragon transcription disclaimer     Much of this encounter note is an electronic transcription/translation of spoken language to printed text. The electronic translation of spoken language may permit erroneous, or at times, nonsensical words or phrases to be inadvertently transcribed. Although I have reviewed the note for such errors, some may still exist.

## 2018-02-21 LAB
BASOPHILS # BLD AUTO: 0.04 10*3/MM3 (ref 0–0.2)
BASOPHILS NFR BLD AUTO: 0.7 % (ref 0–1.5)
EOSINOPHIL # BLD AUTO: 0.29 10*3/MM3 (ref 0–0.7)
EOSINOPHIL NFR BLD AUTO: 4.9 % (ref 0.3–6.2)
ERYTHROCYTE [DISTWIDTH] IN BLOOD BY AUTOMATED COUNT: 14 % (ref 11.7–13)
FERRITIN SERPL-MCNC: 61.15 NG/ML (ref 13–150)
FOLATE SERPL-MCNC: 7.44 NG/ML (ref 4.78–24.2)
HCT VFR BLD AUTO: 44.3 % (ref 35.6–45.5)
HGB BLD-MCNC: 14.1 G/DL (ref 11.9–15.5)
IMM GRANULOCYTES # BLD: 0 10*3/MM3 (ref 0–0.03)
IMM GRANULOCYTES NFR BLD: 0 % (ref 0–0.5)
IRON SATN MFR SERPL: 26 % (ref 20–50)
IRON SERPL-MCNC: 77 MCG/DL (ref 37–145)
LYMPHOCYTES # BLD AUTO: 2.08 10*3/MM3 (ref 0.9–4.8)
LYMPHOCYTES NFR BLD AUTO: 34.8 % (ref 19.6–45.3)
MCH RBC QN AUTO: 32.6 PG (ref 26.9–32)
MCHC RBC AUTO-ENTMCNC: 31.8 G/DL (ref 32.4–36.3)
MCV RBC AUTO: 102.3 FL (ref 80.5–98.2)
MONOCYTES # BLD AUTO: 0.4 10*3/MM3 (ref 0.2–1.2)
MONOCYTES NFR BLD AUTO: 6.7 % (ref 5–12)
NEUTROPHILS # BLD AUTO: 3.16 10*3/MM3 (ref 1.9–8.1)
NEUTROPHILS NFR BLD AUTO: 52.9 % (ref 42.7–76)
PLATELET # BLD AUTO: 226 10*3/MM3 (ref 140–500)
RBC # BLD AUTO: 4.33 10*6/MM3 (ref 3.9–5.2)
RETICS/RBC NFR AUTO: 0.94 % (ref 0.5–1.5)
TIBC SERPL-MCNC: 301 MCG/DL (ref 298–536)
UIBC SERPL-MCNC: 224 MCG/DL
VIT B12 SERPL-MCNC: 450 PG/ML (ref 211–946)
WBC # BLD AUTO: 5.97 10*3/MM3 (ref 4.5–10.7)

## 2018-05-15 RX ORDER — LEVOTHYROXINE SODIUM 100 UG/1
100 TABLET ORAL DAILY
Qty: 30 TABLET | Refills: 5 | Status: SHIPPED | OUTPATIENT
Start: 2018-05-15 | End: 2018-08-20 | Stop reason: SDUPTHER

## 2018-08-13 ENCOUNTER — LAB (OUTPATIENT)
Dept: ENDOCRINOLOGY | Age: 53
End: 2018-08-13

## 2018-08-13 DIAGNOSIS — R79.89 LOW VITAMIN D LEVEL: ICD-10-CM

## 2018-08-13 DIAGNOSIS — E10.9 TYPE 1 DIABETES MELLITUS WITHOUT COMPLICATION (HCC): ICD-10-CM

## 2018-08-13 DIAGNOSIS — E03.9 PRIMARY HYPOTHYROIDISM: ICD-10-CM

## 2018-08-13 DIAGNOSIS — E10.9 TYPE 1 DIABETES MELLITUS WITHOUT COMPLICATION (HCC): Primary | ICD-10-CM

## 2018-08-14 LAB
25(OH)D3+25(OH)D2 SERPL-MCNC: 33.3 NG/ML (ref 30–100)
ALBUMIN SERPL-MCNC: 4.6 G/DL (ref 3.5–5.2)
ALBUMIN/GLOB SERPL: 2 G/DL
ALP SERPL-CCNC: 109 U/L (ref 39–117)
ALT SERPL-CCNC: 22 U/L (ref 1–33)
AST SERPL-CCNC: 23 U/L (ref 1–32)
BILIRUB SERPL-MCNC: 0.6 MG/DL (ref 0.1–1.2)
BUN SERPL-MCNC: 10 MG/DL (ref 6–20)
BUN/CREAT SERPL: 13.3 (ref 7–25)
C PEPTIDE SERPL-MCNC: <0.1 NG/ML (ref 1.1–4.4)
CALCIUM SERPL-MCNC: 10.4 MG/DL (ref 8.6–10.5)
CHLORIDE SERPL-SCNC: 106 MMOL/L (ref 98–107)
CHOLEST SERPL-MCNC: 180 MG/DL (ref 0–200)
CO2 SERPL-SCNC: 27 MMOL/L (ref 22–29)
CREAT SERPL-MCNC: 0.75 MG/DL (ref 0.57–1)
FT4I SERPL CALC-MCNC: 3.3 (ref 1.2–4.9)
GLOBULIN SER CALC-MCNC: 2.3 GM/DL
GLUCOSE SERPL-MCNC: 58 MG/DL (ref 65–99)
HBA1C MFR BLD: 5.83 % (ref 4.8–5.6)
HDLC SERPL-MCNC: 67 MG/DL (ref 40–60)
INTERPRETATION: NORMAL
LDLC SERPL CALC-MCNC: 104 MG/DL (ref 0–100)
Lab: NORMAL
POTASSIUM SERPL-SCNC: 4.8 MMOL/L (ref 3.5–5.2)
PROT SERPL-MCNC: 6.9 G/DL (ref 6–8.5)
SODIUM SERPL-SCNC: 144 MMOL/L (ref 136–145)
T3FREE SERPL-MCNC: 2.8 PG/ML (ref 2–4.4)
T3RU NFR SERPL: 34 % (ref 24–39)
T4 FREE SERPL-MCNC: 2.05 NG/DL (ref 0.93–1.7)
T4 SERPL-MCNC: 9.8 UG/DL (ref 4.5–12)
TRIGL SERPL-MCNC: 47 MG/DL (ref 0–150)
TSH SERPL DL<=0.005 MIU/L-ACNC: 0.44 UIU/ML (ref 0.45–4.5)
UNABLE TO VOID: NORMAL
VLDLC SERPL CALC-MCNC: 9.4 MG/DL (ref 5–40)

## 2018-08-17 RX ORDER — PERPHENAZINE 16 MG/1
TABLET, FILM COATED ORAL
Qty: 300 EACH | Refills: 4 | Status: SHIPPED | OUTPATIENT
Start: 2018-08-17 | End: 2018-11-15 | Stop reason: SDUPTHER

## 2018-08-20 ENCOUNTER — OFFICE VISIT (OUTPATIENT)
Dept: ENDOCRINOLOGY | Age: 53
End: 2018-08-20

## 2018-08-20 VITALS
SYSTOLIC BLOOD PRESSURE: 114 MMHG | DIASTOLIC BLOOD PRESSURE: 68 MMHG | BODY MASS INDEX: 19.03 KG/M2 | WEIGHT: 100.8 LBS | HEIGHT: 61 IN

## 2018-08-20 DIAGNOSIS — R79.89 LOW VITAMIN D LEVEL: ICD-10-CM

## 2018-08-20 DIAGNOSIS — E03.9 PRIMARY HYPOTHYROIDISM: ICD-10-CM

## 2018-08-20 DIAGNOSIS — R73.9 HYPERGLYCEMIA: ICD-10-CM

## 2018-08-20 DIAGNOSIS — E10.9 TYPE 1 DIABETES MELLITUS WITHOUT COMPLICATION (HCC): Primary | ICD-10-CM

## 2018-08-20 DIAGNOSIS — E11.649 DIABETIC HYPOGLYCEMIA (HCC): ICD-10-CM

## 2018-08-20 PROCEDURE — 99214 OFFICE O/P EST MOD 30 MIN: CPT | Performed by: NURSE PRACTITIONER

## 2018-08-20 RX ORDER — LEVOTHYROXINE SODIUM 100 UG/1
100 TABLET ORAL DAILY
Qty: 30 TABLET | Refills: 5 | Status: SHIPPED | OUTPATIENT
Start: 2018-08-20

## 2018-10-08 RX ORDER — LEVOTHYROXINE SODIUM 100 UG/1
100 TABLET ORAL DAILY
Qty: 30 TABLET | Refills: 1 | Status: SHIPPED | OUTPATIENT
Start: 2018-10-08

## 2018-11-15 RX ORDER — PERPHENAZINE 16 MG/1
TABLET, FILM COATED ORAL
Qty: 300 EACH | Refills: 0 | Status: SHIPPED | OUTPATIENT
Start: 2018-11-15

## 2018-11-20 ENCOUNTER — RESULTS ENCOUNTER (OUTPATIENT)
Dept: ENDOCRINOLOGY | Age: 53
End: 2018-11-20

## 2018-11-20 DIAGNOSIS — R73.9 HYPERGLYCEMIA: ICD-10-CM

## 2018-11-20 DIAGNOSIS — E03.9 PRIMARY HYPOTHYROIDISM: ICD-10-CM

## 2018-11-20 DIAGNOSIS — E10.9 TYPE 1 DIABETES MELLITUS WITHOUT COMPLICATION (HCC): ICD-10-CM

## 2018-11-20 DIAGNOSIS — E11.649 DIABETIC HYPOGLYCEMIA (HCC): ICD-10-CM

## 2018-11-20 DIAGNOSIS — R79.89 LOW VITAMIN D LEVEL: ICD-10-CM

## 2018-12-20 ENCOUNTER — OFFICE VISIT (OUTPATIENT)
Dept: ENDOCRINOLOGY | Age: 53
End: 2018-12-20

## 2018-12-20 VITALS
BODY MASS INDEX: 20.01 KG/M2 | DIASTOLIC BLOOD PRESSURE: 68 MMHG | HEIGHT: 61 IN | WEIGHT: 106 LBS | SYSTOLIC BLOOD PRESSURE: 118 MMHG

## 2018-12-20 DIAGNOSIS — D50.9 IRON DEFICIENCY ANEMIA, UNSPECIFIED IRON DEFICIENCY ANEMIA TYPE: ICD-10-CM

## 2018-12-20 DIAGNOSIS — E11.649 DIABETIC HYPOGLYCEMIA (HCC): ICD-10-CM

## 2018-12-20 DIAGNOSIS — E03.9 PRIMARY HYPOTHYROIDISM: ICD-10-CM

## 2018-12-20 DIAGNOSIS — Z46.81 COUNSELING FOR INSULIN PUMP: ICD-10-CM

## 2018-12-20 DIAGNOSIS — E10.9 TYPE 1 DIABETES MELLITUS WITHOUT COMPLICATION (HCC): Primary | ICD-10-CM

## 2018-12-20 DIAGNOSIS — E67.8 OTHER SPECIFIED HYPERALIMENTATION: ICD-10-CM

## 2018-12-20 DIAGNOSIS — Z79.4 LONG-TERM INSULIN USE (HCC): ICD-10-CM

## 2018-12-20 PROCEDURE — 99214 OFFICE O/P EST MOD 30 MIN: CPT | Performed by: NURSE PRACTITIONER

## 2018-12-20 NOTE — PROGRESS NOTES
Skye Cabrera  presents to the office  for the follow-up appointment for Type 1 diabetes mellitus.     The diabete's condition location is throughout the system with the  clinical course has fluctuated, the severity is Mild, and the modifying/allievating factors are insulin pump.  Medications and  Dosages were  reviewed with Skye Cabrera and suggested that compliance most of the time.    The patient reports associated symptoms of hyperglycemia have been irritability and associated symptoms of hypoglycemia have been confusion, dizziness and sweating, with their  hypoglycemia threshold for symptoms is 70 mg/dl .     The patient is currently on oral medications .      Compliance with blood glucose monitoring: good.     Meal panning: The patient is using carbohydrate counting, but is not on a specified limit, being a pump user.    The patient is currently taking home blood tests - Blood glucose testin-8 times daily, that are:  before each meal and 1 or 2 hours after meal  fasting and bedtime  anytime you feel symptoms of hyperglycemia or hypoglycemia (high or low blood sugars)  Humalog U100  per insulin pump    Last instructions given were:  Insulin pump changes are as follows  Basals-0.55 units per hour  6 AM-0.60 units per hour  10 AM-0.55 units per hour  7 PM-0.80 units per hour  9 PM-0.75 units per hour     Insulin carb ratio  12 AM-1 unit for every 10 g  11 AM-1 unit for 8 g  5 PM-1 unit for 12 g     Insulin sensitivity 1 unit for every 48 mg/Torsten with a blood glucose target of 110 320 mg/Torsten  Maximum bolus TN units  Instructed patient to use dual wave with meals 20% now 80% over 30 minutes-square bolus with high fat meals over 30 minutes.    Home blood glucose testing daily: 4-8  insulin pump upload   -Yes- see upload 2018-2018    Last reported blood glucose readings are:  Home blood glucose testing daily: 4-8  insulin pump upload  -Yes see upload dated 2018 for the report dates   "through August 20, 2018 does show a pattern in the evening from 4 PM to's p.m. of hypoglycemic when looking at the daily over view shows hyperglycemic not correcting her pump she is taken injection.  But overall improved    Patterns reported per patient are none.         The following portions of the patient's history were reviewed and updated as appropriate: current medications, past family history, past medical history, past social history, past surgical history and problem list.      Current Outpatient Medications:   •  amoxicillin (AMOXIL) 875 MG tablet, 1 pill q12, Disp: 20 tablet, Rfl: 0  •  JIMENEZ CONTOUR NEXT TEST test strip, Check blood glucose up to 9 times daily.  Use as instructed, Disp: 300 each, Rfl: 11  •  CONTOUR NEXT TEST test strip, CHECK BLOOD GLUCOSE UP TO 9 TIMES DAILY. USE AS INSTRUCTED, Disp: 300 each, Rfl: 0  •  guaiFENesin-codeine (ROMILAR-AC) 100-10 MG/5ML syrup, 5-10 cc's by mouth QID as needed for cough, Disp: 240 mL, Rfl: 0  •  HUMALOG 100 UNIT/ML injection, USE IN INSULIN PUMP UP  UNITS A DAY, Disp: 30 mL, Rfl: 5  •  insulin lispro (humaLOG) 100 UNIT/ML injection, Use in insulin pump up to 100 units a day, Disp: 30 mL, Rfl: 5  •  Insulin Syringe 30G X 1/2\" 0.5 ML misc, Use for insulin injection as needed., Disp: 90 each, Rfl: 1  •  Lancets (FREESTYLE) lancets, Check blood glucose up to 9 times daily., Disp: 300 each, Rfl: 5  •  SLOW RELEASE IRON 160 (50 Fe) MG tablet controlled-release, 1 daily, Disp: 30 each, Rfl: 11  •  UNITHROID 100 MCG tablet, Take 1 tablet by mouth Daily., Disp: 30 tablet, Rfl: 5  •  UNITHROID 100 MCG tablet, TAKE 1 TABLET BY MOUTH DAILY., Disp: 30 tablet, Rfl: 1    Patient Active Problem List    Diagnosis   • Iron deficiency [E61.1]   • Long-term insulin use (CMS/HCC) [Z79.4]   • Hyperglycemia [R73.9]   • Menopausal symptoms [N95.1]   • Diabetic hypoglycemia (CMS/HCC) [E11.649]   • Type 1 diabetes mellitus without complication (CMS/HCC) [E10.9]   • Primary " "hypothyroidism [E03.9]   • Anemia [D64.9]       Review of Systems   A comprehensive review of the 14 systems was negative except of listed below:  Endocrine: hypoglycemia  hyperglycemia     Objective:     Wt Readings from Last 3 Encounters:   12/20/18 48.1 kg (106 lb)   08/20/18 45.7 kg (100 lb 12.8 oz)   02/20/18 47.6 kg (105 lb)     Temp Readings from Last 3 Encounters:   09/19/17 99.5 °F (37.5 °C)     BP Readings from Last 3 Encounters:   12/20/18 118/68   08/20/18 114/68   02/20/18 118/72     Pulse Readings from Last 3 Encounters:   09/19/17 91        /68   Ht 154.9 cm (60.98\")   Wt 48.1 kg (106 lb)   BMI 20.04 kg/m²        Physical Exam   Constitutional: She is oriented to person, place, and time. She appears well-developed and well-nourished. No distress.   HENT:   Head: Normocephalic and atraumatic.   Eyes: EOM are normal. Pupils are equal, round, and reactive to light.   Neck: Normal range of motion. Neck supple. No thyromegaly present.   Cardiovascular: Normal rate, regular rhythm, normal heart sounds and intact distal pulses.   No murmur heard.  Pulmonary/Chest: Effort normal and breath sounds normal.   Abdominal: Soft. Bowel sounds are normal.   Musculoskeletal: Normal range of motion.   Neurological: She is alert and oriented to person, place, and time.   Skin: Skin is warm and dry. Capillary refill takes 2 to 3 seconds. She is not diaphoretic.   Psychiatric: She has a normal mood and affect. Her behavior is normal. Judgment and thought content normal.   Nursing note and vitals reviewed.          Lab Review  Results for orders placed or performed in visit on 08/13/18   Comprehensive Metabolic Panel   Result Value Ref Range    Glucose 58 (L) 65 - 99 mg/dL    BUN 10 6 - 20 mg/dL    Creatinine 0.75 0.57 - 1.00 mg/dL    eGFR Non African Am 80 >60 mL/min/1.73    eGFR African Am 97 >60 mL/min/1.73    BUN/Creatinine Ratio 13.3 7.0 - 25.0    Sodium 144 136 - 145 mmol/L    Potassium 4.8 3.5 - 5.2 mmol/L "    Chloride 106 98 - 107 mmol/L    Total CO2 27.0 22.0 - 29.0 mmol/L    Calcium 10.4 8.6 - 10.5 mg/dL    Total Protein 6.9 6.0 - 8.5 g/dL    Albumin 4.60 3.50 - 5.20 g/dL    Globulin 2.3 gm/dL    A/G Ratio 2.0 g/dL    Total Bilirubin 0.6 0.1 - 1.2 mg/dL    Alkaline Phosphatase 109 39 - 117 U/L    AST (SGOT) 23 1 - 32 U/L    ALT (SGPT) 22 1 - 33 U/L   C-Peptide   Result Value Ref Range    C-Peptide <0.1 (L) 1.1 - 4.4 ng/mL   Hemoglobin A1c   Result Value Ref Range    Hemoglobin A1C 5.83 (H) 4.80 - 5.60 %   Vitamin D 25 Hydroxy   Result Value Ref Range    25 Hydroxy, Vitamin D 33.3 30.0 - 100.0 ng/ml   T4, Free   Result Value Ref Range    Free T4 2.05 (H) 0.93 - 1.70 ng/dL   T3, Free   Result Value Ref Range    T3, Free 2.8 2.0 - 4.4 pg/mL   Thyroid Panel With TSH   Result Value Ref Range    TSH 0.439 (L) 0.450 - 4.500 uIU/mL    T4, Total 9.8 4.5 - 12.0 ug/dL    T3 Uptake 34 24 - 39 %    Free Thyroxine Index 3.3 1.2 - 4.9   Lipid Panel   Result Value Ref Range    Total Cholesterol 180 0 - 200 mg/dL    Triglycerides 47 0 - 150 mg/dL    HDL Cholesterol 67 (H) 40 - 60 mg/dL    VLDL Cholesterol 9.4 5 - 40 mg/dL    LDL Cholesterol  104 (H) 0 - 100 mg/dL   Cardiovascular Risk Assessment   Result Value Ref Range    Interpretation Note    Diabetes Patient Education   Result Value Ref Range    PDF Image Not applicable    Unable To Void   Result Value Ref Range    Unable to Void Comment            Assessment:   Skye was seen today for follow-up.    Diagnoses and all orders for this visit:    Type 1 diabetes mellitus without complication (CMS/[facility])  -     Comprehensive Metabolic Panel; Future  -     Hemoglobin A1c; Future  -     Lipid Panel; Future  -     Microalbumin / Creatinine Urine Ratio - Urine, Clean Catch; Future  -     Uric Acid; Future  -     Vitamin D 25 Hydroxy; Future  -     TSH; Future  -     T4; Future  -     T3; Future  -     Anemia Profile A ([facility]); Future  -     Anemia Profile B ([facility]); Future    Primary  hypothyroidism  -     TSH  -     T4  -     T3  -     Comprehensive Metabolic Panel; Future  -     TSH; Future  -     T4; Future  -     T3; Future    Diabetic hypoglycemia (CMS/HCC)  -     Comprehensive Metabolic Panel; Future    Long-term insulin use (CMS/HCC)  -     Comprehensive Metabolic Panel; Future    Counseling for insulin pump  -     Comprehensive Metabolic Panel; Future    Iron deficiency anemia, unspecified iron deficiency anemia type  -     Anemia Profile A ([facility])  -     Anemia Profile B ([facility])  -     Comprehensive Metabolic Panel; Future  -     Anemia Profile A ([facility]); Future  -     Anemia Profile B ([facility]); Future    Other specified hyperalimentation   -     Vitamin D 25 Hydroxy; Future          Plan:   In summary/ Medication changes: I met with this patient is metabolically stable and doing well at this time.  Laboratory testing was reviewed dated , discussed with questions and answers completed.  Discussed and formulate a treatment plan with patient, patient verbally stated understood all instructions.       Type 1 diabetes mellitus without complication -insulin pump uploaded evaluated  Insulin pump changes  12 AM-0.5-5 units per hour  3 PM-0.6250 units per hour  7 PM-0.825 units per hour  9 carb ratio one unit for every 10 g of carbohydrates  Insulin sensitivity 1 unit for every 51 mg/Torsten with a target range of 110 320 mg/Torsten  Active insulin time 3 hours      Primary hypothyroidism-  chronic, stable.  No medication changes     Diabetic hypoglycemia (CMS/HCC)- removing-patient is using pump correctly.    -      Iron deficiency anemia, unspecified iron deficiency anemia type -chronic we will draw laboratory testing at this time to recheck iron.  -     Additional instructions:  home blood tests -  Blood glucose testin times daily, that are:  fasting- 1st thing in morning before eating or drinking  before each meal and 1 or 2 hours after meal  anytime you feel symptoms of  hyperglycemia or hypoglycemia (high or low blood sugars)        Education:  interpretation of lab results, blood sugar goals, complications of diabetes mellitus, hypoglycemia prevention and treatment, exercise, illness management, self-monitoring of blood glucose skills, nutrition and carbohydrate counting        The total face to face time spent was  25 minutes  with additional education given: 14 minutes (greater than 50% of the total time) was spent with counseling and coordination of care on: SMBG with goals, Side effects profiles with medications, medication use and purposes,     Return in about 3 months (around 3/20/2019), or if symptoms worsen or fail to improve, for Recheck. 3 months with Haritha-2 weeks prior for labs 6 months with Dr. Morris-2 weeks prior for labs      Dragon transcription disclaimer     Much of this encounter note is an electronic transcription/translation of spoken language to printed text. The electronic translation of spoken language may permit erroneous, or at times, nonsensical words or phrases to be inadvertently transcribed. Although I have reviewed the note for such errors, some may still exist.

## 2018-12-21 LAB
BASOPHILS # BLD AUTO: 0.05 10*3/MM3 (ref 0–0.2)
BASOPHILS NFR BLD AUTO: 0.9 % (ref 0–1.5)
EOSINOPHIL # BLD AUTO: 0.35 10*3/MM3 (ref 0–0.7)
EOSINOPHIL NFR BLD AUTO: 6.3 % (ref 0.3–6.2)
ERYTHROCYTE [DISTWIDTH] IN BLOOD BY AUTOMATED COUNT: 14.4 % (ref 11.7–13)
FERRITIN SERPL-MCNC: 31.29 NG/ML (ref 13–150)
FOLATE SERPL-MCNC: 9.32 NG/ML (ref 4.78–24.2)
HCT VFR BLD AUTO: 45.7 % (ref 35.6–45.5)
HGB BLD-MCNC: 15.3 G/DL (ref 11.9–15.5)
IMM GRANULOCYTES # BLD: 0.01 10*3/MM3 (ref 0–0.03)
IMM GRANULOCYTES NFR BLD: 0.2 % (ref 0–0.5)
IRON SATN MFR SERPL: 34 % (ref 20–50)
IRON SERPL-MCNC: 120 MCG/DL (ref 37–145)
LYMPHOCYTES # BLD AUTO: 1.83 10*3/MM3 (ref 0.9–4.8)
LYMPHOCYTES NFR BLD AUTO: 32.7 % (ref 19.6–45.3)
MCH RBC QN AUTO: 34.7 PG (ref 26.9–32)
MCHC RBC AUTO-ENTMCNC: 33.5 G/DL (ref 32.4–36.3)
MCV RBC AUTO: 103.6 FL (ref 80.5–98.2)
MONOCYTES # BLD AUTO: 0.47 10*3/MM3 (ref 0.2–1.2)
MONOCYTES NFR BLD AUTO: 8.4 % (ref 5–12)
NEUTROPHILS # BLD AUTO: 2.9 10*3/MM3 (ref 1.9–8.1)
NEUTROPHILS NFR BLD AUTO: 51.7 % (ref 42.7–76)
PLATELET # BLD AUTO: 184 10*3/MM3 (ref 140–500)
RBC # BLD AUTO: 4.41 10*6/MM3 (ref 3.9–5.2)
RETICS/RBC NFR AUTO: 1.09 % (ref 0.5–1.5)
T3 SERPL-MCNC: 106.3 NG/DL (ref 80–200)
T4 SERPL-MCNC: 10.55 MCG/DL (ref 4.5–11.7)
TIBC SERPL-MCNC: 352 MCG/DL
TSH SERPL DL<=0.005 MIU/L-ACNC: 0.69 MIU/ML (ref 0.27–4.2)
UIBC SERPL-MCNC: 232 MCG/DL
VIT B12 SERPL-MCNC: 211 PG/ML (ref 211–946)
WBC # BLD AUTO: 5.6 10*3/MM3 (ref 4.5–10.7)

## 2019-01-10 ENCOUNTER — TELEPHONE (OUTPATIENT)
Dept: ENDOCRINOLOGY | Age: 54
End: 2019-01-10

## 2019-01-10 NOTE — TELEPHONE ENCOUNTER
----- Message from RAMANA Hernandez sent at 1/9/2019  9:49 AM EST -----  Labs normal for thyroid, no signs of anemia with iron. H&H slt increase. Can f/u with PCP for more workup    ----- Message -----  From: Nan Walton MA  Sent: 1/8/2019   4:15 PM  To: RAMANA Hernandez    Please advise.     ----- Message -----  From: Mercedez Kraus  Sent: 1/8/2019  10:25 AM  To: Nan Walton MA    Inquiring about her lab results  Please call patient

## 2019-01-11 ENCOUNTER — TELEPHONE (OUTPATIENT)
Dept: ENDOCRINOLOGY | Age: 54
End: 2019-01-11

## 2019-01-11 NOTE — TELEPHONE ENCOUNTER
----- Message from Mercedez Kraus sent at 1/11/2019  8:40 AM EST -----  Contact: [phone]  Left a message on Monday and have not heard back    Regarding her lab work     Looking for the results

## 2019-03-20 ENCOUNTER — RESULTS ENCOUNTER (OUTPATIENT)
Dept: ENDOCRINOLOGY | Age: 54
End: 2019-03-20

## 2019-03-20 DIAGNOSIS — Z79.4 LONG-TERM INSULIN USE (HCC): ICD-10-CM

## 2019-03-20 DIAGNOSIS — Z46.81 COUNSELING FOR INSULIN PUMP: ICD-10-CM

## 2019-03-20 DIAGNOSIS — E67.8 OTHER SPECIFIED HYPERALIMENTATION: ICD-10-CM

## 2019-03-20 DIAGNOSIS — D50.9 IRON DEFICIENCY ANEMIA, UNSPECIFIED IRON DEFICIENCY ANEMIA TYPE: ICD-10-CM

## 2019-03-20 DIAGNOSIS — E03.9 PRIMARY HYPOTHYROIDISM: ICD-10-CM

## 2019-03-20 DIAGNOSIS — E11.649 DIABETIC HYPOGLYCEMIA (HCC): ICD-10-CM

## 2019-03-20 DIAGNOSIS — E10.9 TYPE 1 DIABETES MELLITUS WITHOUT COMPLICATION (HCC): ICD-10-CM

## 2019-07-24 LAB
25(OH)D3+25(OH)D2 SERPL-MCNC: 26.7 NG/ML (ref 30–100)
ALBUMIN SERPL-MCNC: 4.2 G/DL (ref 3.5–5.5)
ALBUMIN/GLOB SERPL: 1.8 {RATIO} (ref 1.2–2.2)
ALP SERPL-CCNC: 121 IU/L (ref 39–117)
ALT SERPL-CCNC: 14 IU/L (ref 0–32)
AST SERPL-CCNC: 24 IU/L (ref 0–40)
BASOPHILS # BLD AUTO: 0 X10E3/UL (ref 0–0.2)
BASOPHILS NFR BLD AUTO: 1 %
BILIRUB SERPL-MCNC: 0.7 MG/DL (ref 0–1.2)
BUN SERPL-MCNC: 9 MG/DL (ref 6–24)
BUN/CREAT SERPL: 13 (ref 9–23)
C PEPTIDE SERPL-MCNC: <0.1 NG/ML (ref 1.1–4.4)
CALCIUM SERPL-MCNC: 10.1 MG/DL (ref 8.7–10.2)
CHLORIDE SERPL-SCNC: 107 MMOL/L (ref 96–106)
CHOLEST SERPL-MCNC: 174 MG/DL (ref 100–199)
CO2 SERPL-SCNC: 21 MMOL/L (ref 20–29)
CREAT SERPL-MCNC: 0.71 MG/DL (ref 0.57–1)
EOSINOPHIL # BLD AUTO: 0.5 X10E3/UL (ref 0–0.4)
EOSINOPHIL NFR BLD AUTO: 9 %
ERYTHROCYTE [DISTWIDTH] IN BLOOD BY AUTOMATED COUNT: 12.9 % (ref 12.3–15.4)
FERRITIN SERPL-MCNC: 21 NG/ML (ref 15–150)
GLOBULIN SER CALC-MCNC: 2.3 G/DL (ref 1.5–4.5)
GLUCOSE SERPL-MCNC: 138 MG/DL (ref 65–99)
HBA1C MFR BLD: 5.8 % (ref 4.8–5.6)
HCT VFR BLD AUTO: 42 % (ref 34–46.6)
HDLC SERPL-MCNC: 69 MG/DL
HGB BLD-MCNC: 14.6 G/DL (ref 11.1–15.9)
IMM GRANULOCYTES # BLD AUTO: 0 X10E3/UL (ref 0–0.1)
IMM GRANULOCYTES NFR BLD AUTO: 0 %
INTERPRETATION: NORMAL
IRON SATN MFR SERPL: 33 % (ref 15–55)
IRON SERPL-MCNC: 93 UG/DL (ref 27–159)
LDLC SERPL CALC-MCNC: 94 MG/DL (ref 0–99)
LYMPHOCYTES # BLD AUTO: 1.5 X10E3/UL (ref 0.7–3.1)
LYMPHOCYTES NFR BLD AUTO: 28 %
Lab: NORMAL
Lab: NORMAL
MCH RBC QN AUTO: 33.6 PG (ref 26.6–33)
MCHC RBC AUTO-ENTMCNC: 34.8 G/DL (ref 31.5–35.7)
MCV RBC AUTO: 97 FL (ref 79–97)
METHYLMALONATE SERPL-SCNC: 161 NMOL/L (ref 0–378)
MONOCYTES # BLD AUTO: 0.6 X10E3/UL (ref 0.1–0.9)
MONOCYTES NFR BLD AUTO: 11 %
NEUTROPHILS # BLD AUTO: 2.7 X10E3/UL (ref 1.4–7)
NEUTROPHILS NFR BLD AUTO: 51 %
PLATELET # BLD AUTO: 178 X10E3/UL (ref 150–450)
POTASSIUM SERPL-SCNC: 4.7 MMOL/L (ref 3.5–5.2)
PROT SERPL-MCNC: 6.5 G/DL (ref 6–8.5)
RBC # BLD AUTO: 4.34 X10E6/UL (ref 3.77–5.28)
SODIUM SERPL-SCNC: 143 MMOL/L (ref 134–144)
T4 SERPL-MCNC: 11.5 UG/DL (ref 4.5–12)
TIBC SERPL-MCNC: 285 UG/DL (ref 250–450)
TRIGL SERPL-MCNC: 57 MG/DL (ref 0–149)
TSH SERPL DL<=0.005 MIU/L-ACNC: 0.32 UIU/ML (ref 0.45–4.5)
UIBC SERPL-MCNC: 192 UG/DL (ref 131–425)
URATE SERPL-MCNC: 3.9 MG/DL (ref 2.5–7.1)
VIT B12 SERPL-MCNC: 709 PG/ML (ref 232–1245)
VLDLC SERPL CALC-MCNC: 11 MG/DL (ref 5–40)
WBC # BLD AUTO: 5.3 X10E3/UL (ref 3.4–10.8)

## 2021-03-26 ENCOUNTER — BULK ORDERING (OUTPATIENT)
Dept: CASE MANAGEMENT | Facility: OTHER | Age: 56
End: 2021-03-26

## 2021-03-26 DIAGNOSIS — Z23 IMMUNIZATION DUE: ICD-10-CM

## 2023-05-09 NOTE — PROGRESS NOTES
" [facility]  [address] [place] [id number]  Office: [phone]   Fax:    [phone]     Worried about going to the [employer] game tonight with her IBS-D-.  Ok to take 1- imodiumAD prior to leaving for the game.  Pay attention to earliest signs of needing to use the restroom and go sooner rather than later.        Google a Low FODMAPS diet.  Characteristics and sources of common FODMAPS: : foods that can ferment in your GI system and cause some irritability of the bowels:     F  - Fermentable    O - Oligosaccharides    Fructans, galacto/oligosaccharides  Wheat, barley, rye, onion,jeannette, white part of spring onion (scallion),             Garlic, shallots, artichokes, beetroot, fennel, peas, chicory, pistachio,             Cashews, legumes, lentils, and chickpeas    D - Disaccharides  Lactose      Milk, custard, ice cream, and yogurt    M - Monosaccharides \"Free Fructose\" (fructose > glucose)  Apples, pears, mangoes, cherries, watermelon, asparagus, sugar snap peas,             Honey, high-fructose corn syrup  A - And    P - Polyols    Sorbitol, mannitol, maltitol, & xylitol  Apples, pears, apricots, cherries, nectarines, peaches, plums, watermelon,              Mushrooms, cauliflower, artificial sweeteners in gum, candy and drinks    FODMAPS: Fermentable Oligosaccharides, Disaccharieds, Monosaccharides, And Polyols    Adapted from [employer]: American Journal of Gastroenterology.  Annabelle SH, Matilda MC, Eric ND. Short-chain carbohydrates and functional gastrointestinal disorders.  Am J Gastroenterol 2013; 108: 707.  Copyright 2013.  Www.nature.com/ajg    Google a Low FODMAPS diet.     Patient Education   Personalized Prevention Plan  You are due for the preventive services outlined below.  Your care team is available to assist you in scheduling these services.  If you have already completed any of these items, please share that information with your care team to " Skye Cabrera  presents to the office  for the follow-up appointment for Type 1 diabetes mellitus.     The diabete's condition location is throughout the system with the  clinical course has fluctuated, the severity is Mild, and the modifying/allievating factors are insulin.  Medications and  Dosages were  reviewed with Skye Cabrera and suggested that compliance most of the time.    The patient reports associated symptoms of hyperglycemia have been irritability and associated symptoms of hypoglycemia have been confusion, dizziness and sweating, with their  hypoglycemia threshold for symptoms is 70 mg/dl .     The patient is currently on insulin.    Compliance with blood glucose monitoring: good.     Meal panning: The patient is using carbohydrate counting, but is not on a specified limit, being a pump user.    The patient is currently taking home blood tests - Blood glucose testin-8 times daily, that are:  before each meal and 1 or 2 hours after meal  fasting and bedtime  anytime you feel symptoms of hyperglycemia or hypoglycemia (high or low blood sugars)  Humalog U100 per insulin pump    Last instructions given were:  Insulin pump settings are as follows:  Basals:  12 AM 0.525 units per hour  6 AM 0.525 units per hour  7 PM 0.75 units per hour     Carb ratio one unit for every 10 g     Insulin sensitivity 1 unit for every 50     Blood glucose targets 120 through 130 mg/Torsten  Active insulin time 4 hours  Dual wave is on bolus speed standard  Encourage patient to use bolus wizard.    Home blood glucose testing daily: 4-8  insulin pump upload  -Yes see upload dated 2018 for the report dates  through 2018 does show a pattern in the evening from 4 PM to's p.m. of hypoglycemic when looking at the daily over view shows hyperglycemic not correcting her pump she is taken injection.  But overall improved    Last reported blood glucose readings are:  Home blood glucose testing daily:  "4-8  insulin pump upload  -Yes- see pump upload dated February 7 through February 20, 2018 average blood glucose is 129+ or negative's 61 checking 6.2 times a day 23% above target 18% below target with a 31.3% total daily dose with 44% BM basal and 56% BM bolusing.  Labile blood glucose showing hypoglycemic throughout the day appears to be secondary to fall from correction.    Patterns reported per patient are none.         The following portions of the patient's history were reviewed and updated as appropriate: current medications, past family history, past medical history, past social history, past surgical history and problem list.      Current Outpatient Prescriptions:   •  amoxicillin (AMOXIL) 875 MG tablet, 1 pill q12, Disp: 20 tablet, Rfl: 0  •  JIMENEZ CONTOUR NEXT TEST test strip, Check blood glucose up to 9 times daily.  Use as instructed, Disp: 300 each, Rfl: 11  •  CONTOUR NEXT TEST test strip, CHECK BLOOD GLUCOSE UP TO 9 TIMES DAILY. USE AS INSTRUCTED, Disp: 300 each, Rfl: 4  •  guaiFENesin-codeine (ROMILAR-AC) 100-10 MG/5ML syrup, 5-10 cc's by mouth QID as needed for cough, Disp: 240 mL, Rfl: 0  •  insulin lispro (humaLOG) 100 UNIT/ML injection, Use in insulin pump up to 100 units a day, Disp: 30 mL, Rfl: 5  •  Insulin Syringe 30G X 1/2\" 0.5 ML misc, Use for insulin injection as needed., Disp: 90 each, Rfl: 1  •  Lancets (FREESTYLE) lancets, Check blood glucose up to 9 times daily., Disp: 300 each, Rfl: 5  •  SLOW RELEASE IRON 160 (50 Fe) MG tablet controlled-release, 1 daily, Disp: 30 each, Rfl: 11  •  UNITHROID 100 MCG tablet, Take 1 tablet by mouth Daily., Disp: 30 tablet, Rfl: 5    Patient Active Problem List    Diagnosis   • Iron deficiency [E61.1]   • Long-term insulin use (CMS/HCC) [Z79.4]   • Hyperglycemia [R73.9]   • Menopausal symptoms [N95.1]   • Diabetic hypoglycemia (CMS/HCC) [E11.649]   • Type 1 diabetes mellitus without complication (CMS/HCC) [E10.9]   • Primary hypothyroidism [E03.9]   • " update in your medical record.  Health Maintenance Due   Topic Date Due     Diptheria Tetanus Pertussis (DTAP/TDAP/TD) Vaccine (2 - Td or Tdap) 03/25/2023     A1C Lab  04/20/2023     Cholesterol Lab  05/06/2023     Eye Exam  06/09/2023     Preventive Health Recommendations    See your health care provider every year to    Review health changes.     Discuss preventive care.      Review your medicines if your doctor has prescribed any.    You no longer need a yearly Pap test unless you've had an abnormal Pap test in the past 10 years. If you have vaginal symptoms, such as bleeding or discharge, be sure to talk with your provider about a Pap test.    Every 1 to 2 years, have a mammogram.  If you are over 69, talk with your health care provider about whether or not you want to continue having screening mammograms.    Every 10 years, have a colonoscopy. Or, have a yearly FIT test (stool test). These exams will check for colon cancer.     Have a cholesterol test every 5 years, or more often if your doctor advises it.     Have a diabetes test (fasting glucose) every three years. If you are at risk for diabetes, you should have this test more often.     At age 65, have a bone density scan (DEXA) to check for osteoporosis (brittle bone disease).    Shots:    Get a flu shot each year.    Get a tetanus shot every 10 years.    Talk to your doctor about your pneumonia vaccines. There are now two you should receive - Pneumovax (PPSV 23) and Prevnar (PCV 13).    Talk to your pharmacist about the shingles vaccine.    Talk to your doctor about the hepatitis B vaccine.    Nutrition:     Eat at least 5 servings of fruits and vegetables each day.    Eat whole-grain bread, whole-wheat pasta and brown rice instead of white grains and rice.    Get adequate Calcium and Vitamin D.     Lifestyle    Exercise at least 150 minutes a week (30 minutes a day, 5 days a week). This will help you control your weight and prevent disease.    Limit  "Anemia [D64.9]       Review of Systems   A comprehensive review of the 14 systems was negative except of listed below:  Constitutional: fatigue  Endocrine: hypoglycemia  hyperglycemia     Objective:     Wt Readings from Last 3 Encounters:   08/20/18 45.7 kg (100 lb 12.8 oz)   02/20/18 47.6 kg (105 lb)   11/16/17 49 kg (108 lb)     Temp Readings from Last 3 Encounters:   09/19/17 99.5 °F (37.5 °C)     BP Readings from Last 3 Encounters:   08/20/18 114/68   02/20/18 118/72   11/16/17 120/68     Pulse Readings from Last 3 Encounters:   09/19/17 91        /68   Ht 154.9 cm (60.98\")   Wt 45.7 kg (100 lb 12.8 oz)   BMI 19.06 kg/m²     General appearance:  alert, cooperative, delirious, fatigued, nourished\" \"appears stated age and cooperative\" \"NAD   Neck: no carotid bruit, supple, symmetrical, trachea midline and thyroid not enlarged, symmetric, no tenderness/mass/nodules   Thyroid:  no palpable nodule, no enlargement, no tenderness   Lung:  \"clear to auscultation bilaterally\" \"no abnormal breath sounds  \" effort was normal, no labored breath, no use of accessory muscles.   Heart: regular rate and rhythm, S1, S2 normal, no murmur, click, rub or gallop      Abdomen:  normal bowel sounds- 4 quads, soft non-tender and contour - slt rounded      Extremities: extremities normal, atraumatic, no cyanosis or edema extremities normal, atraumatic, no cyanosis or edema\" WNL - gait and station, strength and stability\"       Skin:   Pulses:  warm and dry, no hyperpigmentation, normal skin coloring, or suspicious lesions   2+ and symmetric   Neuro: Alert and oriented x3. Gait normal. Reflexes and motor strength normal and symmetric. Cranial nerves 2-12 and sensation grossly intact.      Psych: behavior - normal, judgement - normal, mood - normal, affect - normal                 Lab Review  Results for orders placed or performed in visit on 08/13/18   Comprehensive Metabolic Panel   Result Value Ref Range    Glucose 58 (L) 65 - " alcohol to one drink per day.    No smoking.     Wear sunscreen to prevent skin cancer.     See your dentist twice a year for an exam and cleaning.    See your eye doctor every 1 to 2 years to screen for conditions such as glaucoma, macular degeneration and cataracts.    Personalized Prevention Plan  You are due for the preventive services outlined below.  Your care team is available to assist you in scheduling these services.  If you have already completed any of these items, please share that information with your care team to update in your medical record.  Health Maintenance   Topic Date Due     DTAP/TDAP/TD IMMUNIZATION (2 - Td or Tdap) 03/25/2023     A1C  04/20/2023     LIPID  05/06/2023     EYE EXAM  06/09/2023     BMP  11/03/2023     MICROALBUMIN  11/03/2023     MAMMO SCREENING  01/17/2024     MEDICARE ANNUAL WELLNESS VISIT  05/09/2024     DIABETIC FOOT EXAM  05/09/2024     ANNUAL REVIEW OF HM ORDERS  05/09/2024     FALL RISK ASSESSMENT  05/09/2024     DEXA  06/15/2025     ADVANCE CARE PLANNING  05/09/2028     PHQ-2 (once per calendar year)  Completed     INFLUENZA VACCINE  Completed     Pneumococcal Vaccine: 65+ Years  Completed     URINALYSIS  Completed     ZOSTER IMMUNIZATION  Completed     COVID-19 Vaccine  Completed     IPV IMMUNIZATION  Aged Out     MENINGITIS IMMUNIZATION  Aged Out     HEPATITIS C SCREENING  Discontinued     COLORECTAL CANCER SCREENING  Discontinued       Exercise for a Healthier Heart  You may wonder how you can improve the health of your heart. If you re thinking about exercise, you re on the right track. You don t need to become an athlete. But you do need a certain amount of brisk exercise to help strengthen your heart. If you have been diagnosed with a heart condition, your healthcare provider may advise exercise to help your condition. To help make exercise a habit, choose safe, fun activities.      Exercise with a friend. When activity is fun, you're more likely to stick with  99 mg/dL    BUN 10 6 - 20 mg/dL    Creatinine 0.75 0.57 - 1.00 mg/dL    eGFR Non African Am 80 >60 mL/min/1.73    eGFR African Am 97 >60 mL/min/1.73    BUN/Creatinine Ratio 13.3 7.0 - 25.0    Sodium 144 136 - 145 mmol/L    Potassium 4.8 3.5 - 5.2 mmol/L    Chloride 106 98 - 107 mmol/L    Total CO2 27.0 22.0 - 29.0 mmol/L    Calcium 10.4 8.6 - 10.5 mg/dL    Total Protein 6.9 6.0 - 8.5 g/dL    Albumin 4.60 3.50 - 5.20 g/dL    Globulin 2.3 gm/dL    A/G Ratio 2.0 g/dL    Total Bilirubin 0.6 0.1 - 1.2 mg/dL    Alkaline Phosphatase 109 39 - 117 U/L    AST (SGOT) 23 1 - 32 U/L    ALT (SGPT) 22 1 - 33 U/L   C-Peptide   Result Value Ref Range    C-Peptide <0.1 (L) 1.1 - 4.4 ng/mL   Hemoglobin A1c   Result Value Ref Range    Hemoglobin A1C 5.83 (H) 4.80 - 5.60 %   Vitamin D 25 Hydroxy   Result Value Ref Range    25 Hydroxy, Vitamin D 33.3 30.0 - 100.0 ng/ml   T4, Free   Result Value Ref Range    Free T4 2.05 (H) 0.93 - 1.70 ng/dL   T3, Free   Result Value Ref Range    T3, Free 2.8 2.0 - 4.4 pg/mL   Thyroid Panel With TSH   Result Value Ref Range    TSH 0.439 (L) 0.450 - 4.500 uIU/mL    T4, Total 9.8 4.5 - 12.0 ug/dL    T3 Uptake 34 24 - 39 %    Free Thyroxine Index 3.3 1.2 - 4.9   Lipid Panel   Result Value Ref Range    Total Cholesterol 180 0 - 200 mg/dL    Triglycerides 47 0 - 150 mg/dL    HDL Cholesterol 67 (H) 40 - 60 mg/dL    VLDL Cholesterol 9.4 5 - 40 mg/dL    LDL Cholesterol  104 (H) 0 - 100 mg/dL   Cardiovascular Risk Assessment   Result Value Ref Range    Interpretation Note    Diabetes Patient Education   Result Value Ref Range    PDF Image Not applicable    Unable To Void   Result Value Ref Range    Unable to Void Comment            Assessment:   Diagnoses and all orders for this visit:    Type 1 diabetes mellitus without complication (CMS/HCC)  -     Fructosamine; Future  -     Comprehensive Metabolic Panel; Future  -     Hemoglobin A1c; Future  -     Lipid Panel; Future  -     Uric Acid; Future  -     Vitamin D  it.     Before you start  Check with your healthcare provider before starting an exercise program. This is especially important if you haven't been active for a while. It's also important if you have a long-term (chronic) health problem such as heart disease, diabetes, or obesity. Also check with your provider if you're at high risk for having these problems.   Why exercise?  Exercising regularly offers many healthy rewards. It can help you do all of these:     Improve your blood cholesterol level to help prevent further heart trouble.    Lower your blood pressure to help prevent a stroke or heart attack.    Control diabetes or reduce your risk of getting this disease.    Improve your heart and lung function.    Reach and stay at a healthy weight.    Make your muscles stronger so you can stay active.    Prevent falls and fractures by slowing the loss of bone mass (osteoporosis).    Manage stress better.    Improve your sense of self and your body image.  Exercise tips      Ease into your routine. Set small goals. Then build on them. Talk with your healthcare provider first before starting an exercise routine if you're not sure what your activity level should be.    Exercise on most days. Aim for a total of at least 150 minutes (2 hours and 30 minutes) or more of moderate-intensity aerobic activity each week. You could also do 75 minutes (1 hour and 15 minutes) or more of vigorous-intensity aerobic activity each week. Or try for a combination of both. Moderate activity means that you breathe heavier and your heart rate increases, but you can still talk. Think about doing at least 30 minutes of moderate exercise, 5 times a week. It's OK to work up to the 30-minute period over time. Examples of moderate-intensity activity are brisk walking, gardening, and water aerobics.    Step up your daily activity level.  Along with your exercise program, try being more active the whole day. Walk instead of drive. Or park further  25 Hydroxy; Future  -     TSH; Future  -     Thyroid Antibodies; Future  -     T4, Free; Future  -     T3, Free; Future  -     T3; Future  -     T4; Future  -     insulin lispro (humaLOG) 100 UNIT/ML injection; Use in insulin pump up to 100 units a day    Primary hypothyroidism  -     Comprehensive Metabolic Panel; Future  -     TSH; Future  -     Thyroid Antibodies; Future  -     T4, Free; Future  -     T3, Free; Future  -     T3; Future  -     T4; Future  -     UNITHROID 100 MCG tablet; Take 1 tablet by mouth Daily.    Low vitamin D level  -     Comprehensive Metabolic Panel; Future  -     Vitamin D 25 Hydroxy; Future    Diabetic hypoglycemia (CMS/HCC)  -     Comprehensive Metabolic Panel; Future    Hyperglycemia  -     Comprehensive Metabolic Panel; Future          Plan:   In summary/ Medication changes:   I met patient today who is metabolically stable and doing better.  She is starting to use her insulin pump.  She also reports hyperglycemic that is not related on the pump.  She also reports that she sees in her pump to calculate insulin but taken injections per syringe.  This provider instructed her know more of that I cannot determine whether hypoglycemic is that she's taken extra insulin per syringe on top of the insulin pump.  Reviewed labwork prior obtained to the visit at this time will be no medication changes.    Insulin pump changes are as follows  Basals-0.55 units per hour  6 AM-0.60 units per hour  10 AM-0.55 units per hour  7 PM-0.80 units per hour  9 PM-0.75 units per hour    Insulin carb ratio  12 AM-1 unit for every 10 g  11 AM-1 unit for 8 g  5 PM-1 unit for 12 g    Insulin sensitivity 1 unit for every 48 mg/Torsten with a blood glucose target of 110 320 mg/Torsten  Maximum bolus TN units  Instructed patient to use dual wave with meals 20% now 80% over 30 minutes-square bolus with high fat meals over 30 minutes.          Education:  interpretation of lab results, blood sugar goals, complications of  away so that you take more steps each day. Do more household tasks or yard work. You may not be able to meet the advised amount of physical activity. But doing some moderate- or vigorous-intensity aerobic activity can help reduce your risk for heart disease. Your healthcare provider can help you figure out what is best for you.    Choose 1 or more activities you enjoy.  Walking is one of the easiest things you can do. You can also try swimming, riding a bike, dancing, or taking an exercise class.    Call 911  Call 911 right away if any of these occur:     Chest pain that doesn't go away quickly with rest    New burning, tightness, pressure, or heaviness in your chest, neck, shoulders, back, or arms    Abnormal or severe shortness of breath    A very fast or irregular heartbeat (palpitations)    Fainting  When to call your healthcare provider  Call your healthcare provider if you have any of these:     Dizziness or lightheadedness    Mild shortness of breath or chest pain    Increased or new joint or muscle pain    [employer] last reviewed this educational content on 7/1/2022 2000-2022 [employer]. All rights reserved. This information is not intended as a substitute for professional medical care. Always follow your healthcare professional's instructions.          Understanding USDA MyPlate  The USDA has guidelines to help you make healthy food choices. These are called MyPlate. MyPlate shows the food groups that make up healthy meals using the image of a place setting. Before you eat, think about the healthiest choices for what to put on your plate or in your cup or bowl. To learn more about building a healthy plate, visit www.choosemyplate.gov.     The food groups    Fruits. Any fruit or 100% fruit juice counts as part of the Fruit Group. Fruits may be fresh, canned, frozen, or dried, and may be whole, cut-up, or pureed. Make 1/2 of your plate fruits and vegetables.    Vegetables. Any vegetable or  100% vegetable juice counts as a member of the Vegetable Group. Vegetables may be fresh, frozen, canned, or dried. They can be served raw or cooked and may be whole, cut-up, or mashed. Make 1/2 of your plate fruits and vegetables.    Grains. All foods made from grains are part of the Grains Group. These include wheat, rice, oats, cornmeal, and barley. Grains are often used to make foods such as bread, pasta, oatmeal, cereal, tortillas, and grits. Grains should be no more than 1/4 of your plate. At least half of your grains should be whole grains.    Protein. This group includes meat, poultry, seafood, beans and peas, eggs, processed soy products (such as tofu), nuts (including nut butters), and seeds. Make protein choices no more than 1/4 of your plate. Meat and poultry choices should be lean or low fat.    Dairy. The Dairy Group includes all fluid milk products and foods made from milk that contain calcium, such as yogurt and cheese. (Foods that have little calcium, such as cream, butter, and cream cheese, are not part of this group.) Most dairy choices should be low-fat or fat-free.    Oils. Oils aren't a food group, but they do contain essential nutrients. However it's important to watch your intake of oils. These are fats that are liquid at room temperature. They include canola, corn, olive, soybean, vegetable, and sunflower oil. Foods that are mainly oil include mayonnaise, certain salad dressings, and soft margarines. You likely already get your daily oil allowance from the foods you eat.  Things to limit  Eating healthy also means limiting these things in your diet:    Salt (sodium). Many processed foods have a lot of sodium. To keep sodium intake down, eat fresh vegetables, meats, poultry, and seafood when possible. Purchase low-sodium, reduced-sodium, or no-salt-added food products at the store. And don't add salt to your meals at home. Instead, season them with herbs and spices such as dill, oregano,  diabetes mellitus, hypoglycemia prevention and treatment, exercise, illness management, self-monitoring of blood glucose skills, nutrition, carbohydrate counting, site rotation, use of insulin pen, insulin adjustments, self-injection of insulin, use of sliding scale/correction formula and use of insulin: carb ratio        The total face to face time spent was  25 minutes  with additional education given: 14 minutes (greater than 50% of the total time) was spent with counseling and coordination of care on: SMBG with goals, Side effects profiles with medications, medication use and purposes,     Return in about 3 months (around 11/20/2018). 3 months with Haritha-2 weeks prior for labs 6 months with Dr. Morris-2 week prior for labs        Dragon transcription disclaimer     Much of this encounter note is an electronic transcription/translation of spoken language to printed text. The electronic translation of spoken language may permit erroneous, or at times, nonsensical words or phrases to be inadvertently transcribed. Although I have reviewed the note for such errors, some may still exist.   cumin, and paprika. Or try adding flavor with lemon or lime zest and juice.    Saturated fat. Saturated fats are most often found in animal products such as beef, pork, and chicken. They are often solid at room temperature, such as butter. To reduce your saturated fat intake, choose leaner cuts of meat and poultry. And try healthier cooking methods such as grilling, broiling, roasting, or baking. For a simple lower-fat swap, use plain nonfat yogurt instead of mayonnaise when making potato salad or macaroni salad.    Added sugars. These are sugars added to foods. They are in foods such as ice cream, candy, soda, fruit drinks, sports drinks, energy drinks, cookies, pastries, jams, and syrups. Cut down on added sugars by sharing sweet treats with a family member or friend. You can also choose fruit for dessert, and drink water or other unsweetened beverages.  [employer] last reviewed this educational content on 6/1/2020 2000-2022 [employer]. All rights reserved. This information is not intended as a substitute for professional medical care. Always follow your healthcare professional's instructions.